# Patient Record
Sex: FEMALE | Race: WHITE | NOT HISPANIC OR LATINO | Employment: OTHER | URBAN - METROPOLITAN AREA
[De-identification: names, ages, dates, MRNs, and addresses within clinical notes are randomized per-mention and may not be internally consistent; named-entity substitution may affect disease eponyms.]

---

## 2017-05-11 ENCOUNTER — HOSPITAL ENCOUNTER (OUTPATIENT)
Facility: HOSPITAL | Age: 82
Discharge: HOME | End: 2017-05-12
Attending: EMERGENCY MEDICINE | Admitting: INTERNAL MEDICINE
Payer: MEDICARE

## 2017-05-11 DIAGNOSIS — I63.511 CEREBROVASCULAR ACCIDENT (CVA) DUE TO STENOSIS OF RIGHT MIDDLE CEREBRAL ARTERY: ICD-10-CM

## 2017-05-11 DIAGNOSIS — I63.519: ICD-10-CM

## 2017-05-11 DIAGNOSIS — I63.9 CEREBROVASCULAR ACCIDENT (CVA), UNSPECIFIED MECHANISM: Primary | ICD-10-CM

## 2017-05-11 DIAGNOSIS — R53.83 FATIGUE: ICD-10-CM

## 2017-05-11 DIAGNOSIS — I63.9 CVA (CEREBRAL VASCULAR ACCIDENT): ICD-10-CM

## 2017-05-11 LAB
ALBUMIN SERPL BCP-MCNC: 4.3 G/DL
ALP SERPL-CCNC: 79 U/L
ALT SERPL W/O P-5'-P-CCNC: 24 U/L
ANION GAP SERPL CALC-SCNC: 14 MMOL/L
AST SERPL-CCNC: 27 U/L
BASOPHILS # BLD AUTO: 0.03 K/UL
BASOPHILS NFR BLD: 0.4 %
BILIRUB SERPL-MCNC: 0.4 MG/DL
BILIRUB UR QL STRIP: NEGATIVE
BNP SERPL-MCNC: 114 PG/ML
BUN SERPL-MCNC: 23 MG/DL
CALCIUM SERPL-MCNC: 9.6 MG/DL
CHLORIDE SERPL-SCNC: 103 MMOL/L
CLARITY UR: CLEAR
CO2 SERPL-SCNC: 24 MMOL/L
COLOR UR: YELLOW
CREAT SERPL-MCNC: 0.9 MG/DL
DIFFERENTIAL METHOD: ABNORMAL
EOSINOPHIL # BLD AUTO: 0.7 K/UL
EOSINOPHIL NFR BLD: 8.5 %
ERYTHROCYTE [DISTWIDTH] IN BLOOD BY AUTOMATED COUNT: 13.9 %
EST. GFR  (AFRICAN AMERICAN): >60 ML/MIN/1.73 M^2
EST. GFR  (NON AFRICAN AMERICAN): 59 ML/MIN/1.73 M^2
GLUCOSE SERPL-MCNC: 87 MG/DL
GLUCOSE UR QL STRIP: NEGATIVE
HCT VFR BLD AUTO: 40.6 %
HGB BLD-MCNC: 13.7 G/DL
HGB UR QL STRIP: ABNORMAL
KETONES UR QL STRIP: NEGATIVE
LEUKOCYTE ESTERASE UR QL STRIP: ABNORMAL
LYMPHOCYTES # BLD AUTO: 2.5 K/UL
LYMPHOCYTES NFR BLD: 33.1 %
MCH RBC QN AUTO: 30.9 PG
MCHC RBC AUTO-ENTMCNC: 33.7 %
MCV RBC AUTO: 91 FL
MICROSCOPIC COMMENT: NORMAL
MONOCYTES # BLD AUTO: 0.7 K/UL
MONOCYTES NFR BLD: 8.7 %
NEUTROPHILS # BLD AUTO: 3.8 K/UL
NEUTROPHILS NFR BLD: 49.3 %
NITRITE UR QL STRIP: NEGATIVE
PH UR STRIP: 6 [PH] (ref 5–8)
PLATELET # BLD AUTO: 192 K/UL
PMV BLD AUTO: 11.1 FL
POTASSIUM SERPL-SCNC: 3.5 MMOL/L
PROT SERPL-MCNC: 8.3 G/DL
PROT UR QL STRIP: NEGATIVE
RBC # BLD AUTO: 4.44 M/UL
RBC #/AREA URNS HPF: 1 /HPF (ref 0–4)
SODIUM SERPL-SCNC: 141 MMOL/L
SP GR UR STRIP: 1.01 (ref 1–1.03)
TROPONIN I SERPL DL<=0.01 NG/ML-MCNC: 0.02 NG/ML
URN SPEC COLLECT METH UR: ABNORMAL
UROBILINOGEN UR STRIP-ACNC: NEGATIVE EU/DL
WBC # BLD AUTO: 7.68 K/UL
WBC #/AREA URNS HPF: 2 /HPF (ref 0–5)

## 2017-05-11 PROCEDURE — 93005 ELECTROCARDIOGRAM TRACING: CPT

## 2017-05-11 PROCEDURE — 80053 COMPREHEN METABOLIC PANEL: CPT

## 2017-05-11 PROCEDURE — 85025 COMPLETE CBC W/AUTO DIFF WBC: CPT

## 2017-05-11 PROCEDURE — 99285 EMERGENCY DEPT VISIT HI MDM: CPT | Mod: 25

## 2017-05-11 PROCEDURE — 83880 ASSAY OF NATRIURETIC PEPTIDE: CPT

## 2017-05-11 PROCEDURE — 84484 ASSAY OF TROPONIN QUANT: CPT

## 2017-05-11 PROCEDURE — 81000 URINALYSIS NONAUTO W/SCOPE: CPT

## 2017-05-11 PROCEDURE — 93010 ELECTROCARDIOGRAM REPORT: CPT | Mod: ,,, | Performed by: INTERNAL MEDICINE

## 2017-05-11 RX ORDER — ATORVASTATIN CALCIUM 40 MG/1
40 TABLET, FILM COATED ORAL DAILY
COMMUNITY

## 2017-05-11 NOTE — IP AVS SNAPSHOT
[address] [place] [address]           Patient Discharge Instructions   Our goal is to set you up for success. This packet includes information on your condition, medications, and your home care.  It will help you care for yourself to prevent having to return to the hospital.     Please ask your nurse if you have any questions.      There are many details to remember when preparing to leave the hospital. Here is what you will need to do:    1. Take your medicine. If you are prescribed medications, review your Medication List on the following pages. You may have new medications to  at the pharmacy and others that you'll need to stop taking. Review the instructions for how and when to take your medications. Talk with your doctor or nurses if you are unsure of what to do.     2. Go to your follow-up appointments. Specific follow-up information is listed in the following pages. Your may be contacted by a nurse or clinical provider about future appointments. Be sure we have all of the phone numbers to reach you. Please contact your provider's office if you are unable to make an appointment.     3. Watch for warning signs. Your doctor or nurse will give you detailed warning signs to watch for and when to call for assistance. These instructions may also include educational information about your condition. If you experience any of warning signs to your health, call your doctor.               ** Verify the list of medication(s) below is accurate and up to date. Carry this with you in case of emergency. If your medications have changed, please notify your healthcare provider.             Medication List      CONTINUE taking these medications        Additional Info                      atorvastatin 40 MG tablet   Commonly known as:  LIPITOR   Refills:  0   Dose:  40 mg    Last time this was given:  40 mg on 5/12/2017  9:15 AM   Instructions:  Take 40 mg by mouth once  daily.     Begin Date    AM    Noon    PM    Bedtime       ELIQUIS 2.5 mg Tab   Refills:  0   Generic drug:  apixaban    Last time this was given:  2.5 mg on 5/12/2017  9:22 AM   Instructions:  Take by mouth 2 (two) times daily.     Begin Date    AM    Noon    PM    Bedtime                  Please bring to all follow up appointments:    1. A copy of your discharge instructions.  2. All medicines you are currently taking in their original bottles.  3. Identification and insurance card.    Please arrive 15 minutes ahead of scheduled appointment time.    Please call 24 hours in advance if you must reschedule your appointment and/or time.        Follow-up Information     Schedule an appointment as soon as possible for a visit with Provider [facility].        Schedule an appointment as soon as possible for a visit in 1 week to follow up.    Why:  If symptoms worsen    Contact information:    upon returning to [place] make appointment with PCP.        Discharge Instructions     Future Orders    Activity as tolerated     Diet general     Questions:    Total calories:      Fat restriction, if any:      Protein restriction, if any:      Na restriction, if any:      Fluid restriction:      Additional restrictions:      Diet general     Questions:    Total calories:      Fat restriction, if any:      Protein restriction, if any:      Na restriction, if any:      Fluid restriction:      Additional restrictions:        Discharge References/Attachments     EATING HEART-HEALTHY FOODS (ENGLISH)    HEART-HEALTHY FOOD, EATING: USING THE DASH PLAN (ENGLISH)    MYPLATE WORKSHEET: 1,600 CALORIES (ENGLISH)        Primary Diagnosis     Your primary diagnosis was:  Not on File      Admission Information     Date & Time Provider Department CSN    5/11/2017 10:17 PM Leander Ortiz MD [facility] [id number]      Care Providers     Provider Role Specialty Primary office phone    Leander Ortiz MD Attending Provider Internal  "Medicine [phone]    Golden Jones MD Consulting Physician  Neurology [phone]    Homa Blackwell MD Team Attending  [facility] [phone]    Everardo Becerril MD Consulting Physician  Neurology [phone]      Your Vitals Were     BP Pulse Temp Resp Height Weight    146/66 (BP Location: Right arm, Patient Position: Lying, BP Method: Automatic) 61 98 °F (36.7 °C) (Oral) 18 4' 11" (1.499 m) 63.5 kg (140 lb)    SpO2 BMI             99% 28.28 kg/m2         Recent Lab Values     No lab values to display.      Pending Labs     Order Current Status    Hemoglobin A1c In process    Lipid panel In process    Troponin I #2 In process      Allergies as of 5/12/2017        Reactions    Pcn [Penicillins]     Shellfish Containing Products Hives      [facility]     [facility] Nurse Care Line - 24/7 Assistance  Unless otherwise directed by your provider, please contact [facility], our nurse care line that is available for 24/7 assistance.     Registered nurses in the [facility] provide clinical advisement, health education, appointment booking, and other advisory services.  Call for this free service at [phone].        Advance Directives     An advance directive is a document which, in the event you are no longer able to make decisions for yourself, tells your healthcare team what kind of treatment you do or do not want to receive, or who you would like to make those decisions for you.  If you do not currently have an advance directive, [facility] encourages you to create one.  For more information call:  [phone]), [phone] ([phone]),  or log on to [email or web address].        Language Assistance Services     ATTENTION: Language assistance services are available, free of charge. Please call [phone].      ATENCIÓN: Si habla español, tiene a benjamin disposición servicios gratuitos de asistencia lingüística. Llame al [phone].     CHÚ Ý: N?u " b?n nói Ti?ng Vi?t, có các d?ch v? h? tr? ngôn ng? mi?n phí dành cho b?n. G?i s? [phone].        Stroke Education              Eliquis Informaiton           [facility] Sign-Up     Activating your [facility] account is as easy as 1-2-3!     1) Visit [email or web address], select Sign Up Now, enter this activation code and your date of birth, then select Next.  [email or web address]  Expires: 6/26/2017  3:33 PM      2) Create a username and password to use when you visit [facility] in the future and select a security question in case you lose your password and select Next.    3) Enter your e-mail address and click Sign Up!    Additional Information  If you have questions, please e-mail [email or web address] or call [phone] to talk to our [facility] staff. Remember, [facility] is NOT to be used for urgent needs. For medical emergencies, dial 911.          [facility] -  complies with applicable Federal civil rights laws and does not discriminate on the basis of race, color, national origin, age, disability, or sex.

## 2017-05-12 VITALS
DIASTOLIC BLOOD PRESSURE: 66 MMHG | HEIGHT: 59 IN | SYSTOLIC BLOOD PRESSURE: 146 MMHG | RESPIRATION RATE: 18 BRPM | HEART RATE: 61 BPM | BODY MASS INDEX: 28.22 KG/M2 | OXYGEN SATURATION: 99 % | WEIGHT: 140 LBS | TEMPERATURE: 98 F

## 2017-05-12 PROBLEM — R53.83 FATIGUE: Status: ACTIVE | Noted: 2017-05-12

## 2017-05-12 PROBLEM — I63.9 CEREBROVASCULAR ACCIDENT (CVA): Status: ACTIVE | Noted: 2017-05-12

## 2017-05-12 PROBLEM — I10 ESSENTIAL HYPERTENSION: Status: ACTIVE | Noted: 2017-05-12

## 2017-05-12 LAB
ALBUMIN SERPL BCP-MCNC: 3.5 G/DL
ALP SERPL-CCNC: 64 U/L
ALT SERPL W/O P-5'-P-CCNC: 28 U/L
ANION GAP SERPL CALC-SCNC: 9 MMOL/L
AST SERPL-CCNC: 27 U/L
BASOPHILS # BLD AUTO: 0.03 K/UL
BASOPHILS NFR BLD: 0.4 %
BILIRUB SERPL-MCNC: 0.7 MG/DL
BUN SERPL-MCNC: 17 MG/DL
CALCIUM SERPL-MCNC: 9.1 MG/DL
CHLORIDE SERPL-SCNC: 108 MMOL/L
CHOLEST/HDLC SERPL: 2.2 {RATIO}
CO2 SERPL-SCNC: 25 MMOL/L
CREAT SERPL-MCNC: 0.8 MG/DL
DIFFERENTIAL METHOD: ABNORMAL
EOSINOPHIL # BLD AUTO: 0.7 K/UL
EOSINOPHIL NFR BLD: 9.7 %
ERYTHROCYTE [DISTWIDTH] IN BLOOD BY AUTOMATED COUNT: 13.7 %
EST. GFR  (AFRICAN AMERICAN): >60 ML/MIN/1.73 M^2
EST. GFR  (NON AFRICAN AMERICAN): >60 ML/MIN/1.73 M^2
ESTIMATED PA SYSTOLIC PRESSURE: 42.94
GLUCOSE SERPL-MCNC: 96 MG/DL
HCT VFR BLD AUTO: 36.4 %
HDL/CHOLESTEROL RATIO: 46.3 %
HDLC SERPL-MCNC: 164 MG/DL
HDLC SERPL-MCNC: 76 MG/DL
HGB BLD-MCNC: 12.2 G/DL
LDLC SERPL CALC-MCNC: 75 MG/DL
LYMPHOCYTES # BLD AUTO: 1.9 K/UL
LYMPHOCYTES NFR BLD: 28.8 %
MCH RBC QN AUTO: 30.4 PG
MCHC RBC AUTO-ENTMCNC: 33.5 %
MCV RBC AUTO: 91 FL
MONOCYTES # BLD AUTO: 0.6 K/UL
MONOCYTES NFR BLD: 8.8 %
NEUTROPHILS # BLD AUTO: 3.5 K/UL
NEUTROPHILS NFR BLD: 52.3 %
NONHDLC SERPL-MCNC: 88 MG/DL
PLATELET # BLD AUTO: 183 K/UL
PMV BLD AUTO: 10.7 FL
POTASSIUM SERPL-SCNC: 3.5 MMOL/L
PROT SERPL-MCNC: 6.5 G/DL
RBC # BLD AUTO: 4.01 M/UL
RETIRED EF AND QEF - SEE NOTES: 60 (ref 55–65)
SODIUM SERPL-SCNC: 142 MMOL/L
TRICUSPID VALVE REGURGITATION: ABNORMAL
TRIGL SERPL-MCNC: 65 MG/DL
WBC # BLD AUTO: 6.73 K/UL

## 2017-05-12 PROCEDURE — 97535 SELF CARE MNGMENT TRAINING: CPT

## 2017-05-12 PROCEDURE — 93306 TTE W/DOPPLER COMPLETE: CPT | Mod: 26,,, | Performed by: INTERNAL MEDICINE

## 2017-05-12 PROCEDURE — 97161 PT EVAL LOW COMPLEX 20 MIN: CPT

## 2017-05-12 PROCEDURE — G8988 SELF CARE GOAL STATUS: HCPCS | Mod: CH

## 2017-05-12 PROCEDURE — 25000003 PHARM REV CODE 250: Performed by: EMERGENCY MEDICINE

## 2017-05-12 PROCEDURE — 97116 GAIT TRAINING THERAPY: CPT

## 2017-05-12 PROCEDURE — G8987 SELF CARE CURRENT STATUS: HCPCS | Mod: CH

## 2017-05-12 PROCEDURE — 96374 THER/PROPH/DIAG INJ IV PUSH: CPT

## 2017-05-12 PROCEDURE — 36415 COLL VENOUS BLD VENIPUNCTURE: CPT

## 2017-05-12 PROCEDURE — 99203 OFFICE O/P NEW LOW 30 MIN: CPT | Mod: ,,, | Performed by: PSYCHIATRY & NEUROLOGY

## 2017-05-12 PROCEDURE — 97165 OT EVAL LOW COMPLEX 30 MIN: CPT

## 2017-05-12 PROCEDURE — 96375 TX/PRO/DX INJ NEW DRUG ADDON: CPT

## 2017-05-12 PROCEDURE — G8978 MOBILITY CURRENT STATUS: HCPCS | Mod: CH

## 2017-05-12 PROCEDURE — 80053 COMPREHEN METABOLIC PANEL: CPT

## 2017-05-12 PROCEDURE — 63600175 PHARM REV CODE 636 W HCPCS: Performed by: INTERNAL MEDICINE

## 2017-05-12 PROCEDURE — G8989 SELF CARE D/C STATUS: HCPCS | Mod: CH

## 2017-05-12 PROCEDURE — 25500020 PHARM REV CODE 255: Performed by: INTERNAL MEDICINE

## 2017-05-12 PROCEDURE — G8980 MOBILITY D/C STATUS: HCPCS | Mod: CH

## 2017-05-12 PROCEDURE — 80061 LIPID PANEL: CPT

## 2017-05-12 PROCEDURE — G0425 INPT/ED TELECONSULT30: HCPCS | Mod: GT,,, | Performed by: PSYCHIATRY & NEUROLOGY

## 2017-05-12 PROCEDURE — 83036 HEMOGLOBIN GLYCOSYLATED A1C: CPT

## 2017-05-12 PROCEDURE — 85025 COMPLETE CBC W/AUTO DIFF WBC: CPT

## 2017-05-12 PROCEDURE — 25000003 PHARM REV CODE 250: Performed by: INTERNAL MEDICINE

## 2017-05-12 PROCEDURE — G0378 HOSPITAL OBSERVATION PER HR: HCPCS

## 2017-05-12 PROCEDURE — 92523 SPEECH SOUND LANG COMPREHEN: CPT

## 2017-05-12 PROCEDURE — G8979 MOBILITY GOAL STATUS: HCPCS | Mod: CH

## 2017-05-12 RX ORDER — ONDANSETRON 2 MG/ML
4 INJECTION INTRAMUSCULAR; INTRAVENOUS EVERY 8 HOURS PRN
Status: DISCONTINUED | OUTPATIENT
Start: 2017-05-12 | End: 2017-05-12 | Stop reason: HOSPADM

## 2017-05-12 RX ORDER — SODIUM CHLORIDE 9 MG/ML
3 INJECTION, SOLUTION INTRAMUSCULAR; INTRAVENOUS; SUBCUTANEOUS EVERY 8 HOURS
Status: DISCONTINUED | OUTPATIENT
Start: 2017-05-12 | End: 2017-05-12 | Stop reason: HOSPADM

## 2017-05-12 RX ORDER — HYDRALAZINE HYDROCHLORIDE 20 MG/ML
10 INJECTION INTRAMUSCULAR; INTRAVENOUS EVERY 8 HOURS PRN
Status: DISCONTINUED | OUTPATIENT
Start: 2017-05-12 | End: 2017-05-12 | Stop reason: HOSPADM

## 2017-05-12 RX ORDER — GUAIFENESIN 100 MG/5ML
200 SOLUTION ORAL EVERY 4 HOURS PRN
Status: DISCONTINUED | OUTPATIENT
Start: 2017-05-12 | End: 2017-05-12 | Stop reason: HOSPADM

## 2017-05-12 RX ORDER — NAPROXEN SODIUM 220 MG/1
81 TABLET, FILM COATED ORAL DAILY
Status: DISCONTINUED | OUTPATIENT
Start: 2017-05-12 | End: 2017-05-12 | Stop reason: HOSPADM

## 2017-05-12 RX ORDER — ACETAMINOPHEN 325 MG/1
650 TABLET ORAL EVERY 6 HOURS PRN
Status: DISCONTINUED | OUTPATIENT
Start: 2017-05-12 | End: 2017-05-12 | Stop reason: HOSPADM

## 2017-05-12 RX ORDER — DIPHENHYDRAMINE HCL 25 MG
25 CAPSULE ORAL EVERY 6 HOURS PRN
Status: DISCONTINUED | OUTPATIENT
Start: 2017-05-12 | End: 2017-05-12 | Stop reason: HOSPADM

## 2017-05-12 RX ORDER — ATORVASTATIN CALCIUM 40 MG/1
40 TABLET, FILM COATED ORAL DAILY
Status: DISCONTINUED | OUTPATIENT
Start: 2017-05-12 | End: 2017-05-12 | Stop reason: HOSPADM

## 2017-05-12 RX ORDER — IPRATROPIUM BROMIDE AND ALBUTEROL SULFATE 2.5; .5 MG/3ML; MG/3ML
3 SOLUTION RESPIRATORY (INHALATION) EVERY 4 HOURS PRN
Status: DISCONTINUED | OUTPATIENT
Start: 2017-05-12 | End: 2017-05-12 | Stop reason: HOSPADM

## 2017-05-12 RX ADMIN — IOHEXOL 100 ML: 350 INJECTION, SOLUTION INTRAVENOUS at 12:05

## 2017-05-12 RX ADMIN — ACETAMINOPHEN 650 MG: 325 TABLET ORAL at 09:05

## 2017-05-12 RX ADMIN — APIXABAN 2.5 MG: 2.5 TABLET, FILM COATED ORAL at 09:05

## 2017-05-12 RX ADMIN — HYDRALAZINE HYDROCHLORIDE 10 MG: 20 INJECTION INTRAMUSCULAR; INTRAVENOUS at 02:05

## 2017-05-12 RX ADMIN — Medication 3 ML: at 06:05

## 2017-05-12 RX ADMIN — ATORVASTATIN CALCIUM 40 MG: 40 TABLET, FILM COATED ORAL at 09:05

## 2017-05-12 RX ADMIN — ASPIRIN 81 MG CHEWABLE TABLET 81 MG: 81 TABLET CHEWABLE at 02:05

## 2017-05-12 NOTE — CONSULTS
[facility]  [facility]-Consult    Consultation started: 5/12/2017 at 12:14 AM   The chief complaint leading to stroke consultation is:  stroke  The referring MD that requested this consult is: adams  The [facility] location is [facility]  [facility] [place] at bedside with patient assisting consultant.     Subjective:     History of Present Illness:  Alyssa De Luna is a 84 y.o. female who presents with Left sided weakness,  Known AFIB, Stroke, PPM. Non-complaint on eliquis having stopped for 2 days.  Noted left sided weakness, CT head shows Right frontal stroke without hemorrhagic elements    Last known normal:  this morning before 0900    Patient information was obtained from patient.    Past Medical History: hypertension and atrial fibrillation    Past Surgical History: no surgeries within the last 3 months    Family History: cancers    Social History: drinking    Medications: No current facility-administered medications for this encounter.     Current Outpatient Prescriptions:     apixaban (ELIQUIS) 2.5 mg Tab, Take by mouth 2 (two) times daily., Disp: , Rfl:     atorvastatin (LIPITOR) 40 MG tablet, Take 40 mg by mouth once daily., Disp: , Rfl:     Allergies:   Review of patient's allergies indicates:   Allergen Reactions    Pcn [penicillins]     Shellfish containing products Hives       Review Of Systems:     Consitutional: no fever or chills  Eyes:no visual changes  ENT:no nasal congestion or sore throat  Respiratory:no cough or shortness of breath  Cardiovascular:no chest pain or palpitations  Gastrointestinal:no nausea or vomiting, no abdominal pain or change in bowel habits  Genitourinary:no hematuria or dysuria  Integument/Breast: no rash or pruritis  Hematologic/Lymphatic:no easy bruising or lymphadenopathy  Musculoskeletal:no arthralgias or myalgias  Neurological:no seizures or tremors  Behavioral/Psych:no auditory or visual hallucinations  Endocrine:no heat or  cold intolerance      Objective:     Vitals:   Vitals:    05/11/17 2234   BP: (!) 202/80   Pulse: 61   Resp: 19   Temp:         Physical Exam:  General: well developed, well nourished   HENT: Head:normocephalic and atraumatic   HENT: Ears: right ear normal and left ear normal  Nose: normal nares and no discharge  Eyes:conjunctivae/corneas clear. PERRL.  Neck: normal, no obvious masses and trachea to midline  Lungs: normal respiratory effort  Cardiovascular: Heart: regular rate and rhythm   Cardiovascular: Extremities: no cyanosis, no edema and no clubbing  Abdomen: appears normal and not distended  Skin:  skin color and texture normal, no rash and no bruises  Musculoskeletal: normal range of motion in all extremities  Psych/Behavioral: appropriate affect      Stroke Scales   Current NIH Stroke Score Values         Most Recent Value    Interval  baseline (upon arrival/admit)    1a. Level Of Consciousness  0-->Alert: keenly responsive    1b. LOC Questions  0-->Answers both questions correctly    1c. LOC Commands  0-->Performs both tasks correctly    2. Best Gaze  0-->Normal    3. Visual  0-->No visual loss    4. Facial Palsy  0-->Normal symmetrical movements    5a. Motor Arm, Left  0-->No drift: limb holds 90 (or 45) degrees for full 10 secs    5b. Motor Arm, Right  0-->No drift: limb holds 90 (or 45) degrees for full 10 secs    6a. Motor Leg, Left  1-->Drift: leg falls by the end of the 5-sec period but does not hit bed    6b. Motor Leg, Right  0-->No drift: leg holds 30 degree position for full 5 secs    7. Limb Ataxia  0-->Absent    8. Sensory  0-->Normal: no sensory loss    9. Best Language  0-->No aphasia: normal    10. Dysarthria  0-->Normal    11. Extinction and Inattention (formerly Neglect)  0-->No abnormality    Total (NIH Stroke Scale)  1              Laboratory Data:   No results found for: EXTCAPBLOODG  No results found for: GLUCOSE   No results found for: EXTPT  No results found for: LABPROT  No results  found for: EXTPTT   No results found for: APTT  No results found for: EXTINR  No results found for: INR  No results found for: EXTCREATININ  Creatinine   Date Value Ref Range Status   05/11/2017 0.9 0.5 - 1.4 mg/dL Final     No results found for: EXTPLATELETS   Platelets   Date Value Ref Range Status   05/11/2017 192 150 - 350 K/uL Final        Assessment - Diagnosis - Goals:     Impression: Alyssa De Luna 84 y.o. female with Stroke  R frontal embolic,  Likely due to afib having accidentally halted eliquis.    Imaging Notes: Abnormal CT R frontal sub acute stroke. Impression performed at: 0024     Diagnosis: Other large vessel stenosis or occlusion with stroke (I63.09)  Other Diagnosis: I10 Hypertension  I48.91 Atrial Fibrillation  M62.89 Acute left side weakness    Altaplase Recommendation: Altaplase not recommended due to Outside of treament window and Unclear onset of symptoms    Possible Interventional Revascularization Candidate? No; No ischemic penumbra    Recommendation: NPO until after pass dysphagia screen Diagnostic studies: Carotid ultrasound to assess vasculature, CT scan of head without contrast to asses brain parenchyma, HgbA1C to assess blood glucose levels, Lipid Profile to assess cholesterol levels, Trans-thoracic cardiac echo to assess cardiac function/status    Disposition Recommendation:  admit to inpatient    Risk/Benefit Discussion: Risks and benefit of treatment (if indicated) were discussed with staff physician.     Additional Work up Recommended in the ED: None    Consulting clinician was informed of the encounter and consult note.    Consultation ended: 5/12/2017 at 0043.

## 2017-05-12 NOTE — PT/OT/SLP EVAL
Occupational Therapy  Evaluation    Alyssa De Luna   MRN: [id number]   Admitting Diagnosis: Cerebrovascular accident (CVA)    OT Date of Treatment: 17   OT Start Time: 0940  OT Stop Time: 1005  OT Total Time (min): 25 min    Billable Minutes:  Evaluation 15 minutes  Self Care/Home Management 10 minutes    Diagnosis: Cerebrovascular accident (CVA)       Past Medical History:   Diagnosis Date    Atrial fibrillation     High cholesterol     Stroke       Past Surgical History:   Procedure Laterality Date    CARDIAC PACEMAKER PLACEMENT      TOTAL KNEE ARTHROPLASTY Right        Referring physician: dr. moran    Date referred to OT: 17    General Precautions: Standard, fall  Orthopedic Precautions: N/A  Braces:            Patient History:  Living Environment  Lives With: alone  Living Arrangements: apartment  Home Layout: Able to live on 1st floor  Living Environment Comment: pt lives at home (i) and drives    Prior level of function:   Bed Mobility/Transfers: independent  Grooming: independent  Bathing: independent  Upper Body Dressing: independent  Lower Body Dressing: independent  Toileting: independent  Driving License: Yes  Mode of Transportation: Car  Occupation: Retired     Dominant hand: right    Subjective:  Communicated with nurse [facility] chart review prior to session.  Chief Complaint:   Patient/Family stated goals:    Pain Ratin/10                   Objective:  Patient found with: telemetry    Cognitive Exam:  Oriented to: Person, Place, Time and Situation  Follows Commands/attention: Follows one-step commands  Communication: clear/fluent  Memory:  No Deficits noted  Safety awareness/insight to disability: intact  Coping skills/emotional control: Appropriate to situation    Visual/perceptual:  Intact    Physical Exam:  Postural examination/scapula alignment: No postural abnormalities identified  Skin integrity: Visible skin intact and Thin  Edema: None noted     Sensation:   Intact    Upper  Extremity Range of Motion:  Right Upper Extremity: WFL  Left Upper Extremity: WFL    Upper Extremity Strength:  Right Upper Extremity: WFL  Left Upper Extremity: WFL   Strength: wfl    Fine motor coordination:   Intact    Gross motor coordination: WFL    Functional Mobility:  Bed Mobility:  Rolling/Turning to Left: Independent  Rolling/Turning Right: Independent  Scooting/Bridging: Independent  Supine to Sit: Independent  Sit to Supine: Independent    Transfers:  Sit <> Stand Assistance: Independent  Sit <> Stand Assistive Device: No Assistive Device  Bed <> Chair Technique: Stand Pivot  Bed <> Chair Transfer Assistance: Independent  Bed <> Chair Assistive Device: No Assistive Device  Toilet Transfer Technique: Stand Pivot  Toilet Transfer Assistance: Independent  Toilet Transfer Assistive Device: No Assistive Device    Functional Ambulation: pt ambulated  200 feet mod (i)    Activities of Daily Living:     Feeding adaptive equipment: na  UE Dressing Level of Assistance: Set-up Assistance  UE adaptive equipment: na  LE Dressing Level of Assistance: Set-up Assistance  LE adaptive equipment: na                    Bathing adaptive equipment: na    Balance:   Static Sit: GOOD+: Takes MAXIMAL challenges from all directions.    Dynamic Sit: GOOD+: Maintains balance through MAXIMAL excursions of active trunk motion  Static Stand: GOOD+: Takes MAXIMAL challenges from all directions  Dynamic stand: GOOD+: Independent gait (with or without assistive device)    Therapeutic Activities and Exercises:  Pt seen in room. Pt requested toileting. Pt req (i) with bed mobility and functional mobility x 8 feet. Pt mod (i) with toileting and (i) with functional mobility x 200 feet    AM-PAC 6 CLICK ADL  How much help from another person does this patient currently need?  1 = Unable, Total/Dependent Assistance  2 = A lot, Maximum/Moderate Assistance  3 = A little, Minimum/Contact Guard/Supervision  4 = None, Modified  "Rock/Independent         AM-PAC Raw Score CMS "G-Code Modifier Level of Impairment Assistance   6 % Total / Unable   7 - 9 CM 80 - 100% Maximal Assist   10 - 14 CL 60 - 80% Moderate Assist   15 - 19 CK 40 - 60% Moderate Assist   20 - 22 CJ 20 - 40% Minimal Assist   23 CI 1-20% SBA / CGA   24 CH 0% Independent/ Mod I       Patient left supine with all lines intact, call button in reach, bed alarm on and nurse oliva notified    Assessment:  Alyssa De Luna is a 84 y.o. female with a medical diagnosis of Cerebrovascular accident (CVA) and presents with pt discharged from skilled o.t. And placed on people movers.    Rehab identified problem list/impairments:      Rehab potential is good.    Activity tolerance: Good    Discharge recommendations: Discharge Facility/Level Of Care Needs: home     Barriers to discharge:      Equipment recommendations: none     GOALS:   Occupational Therapy Goals     Not on file          PLAN:  Patient to be seen   to address the above listed problems via    Plan of Care expires:    Plan of Care reviewed with: patient         Kassiamara Jacobzier, OT  05/12/2017  "

## 2017-05-12 NOTE — ASSESSMENT & PLAN NOTE
- CT head shows subacute right frontal infarct  - Patient symptomatically improving  - Aspirin  - Resume Eliquis  - Neurology consulted (per tele stroke not a TPA candidate)  - Stroke order set used  - Patient to fly back to [place] on May 20, 2017  - Unable to obtain MRI due to pacemake presence  - Check CTA Head/Neck  - Lipid profile, HbA1C  - PT/OT/ST evaluations

## 2017-05-12 NOTE — PLAN OF CARE
Problem: Patient Care Overview  Goal: Plan of Care Review  Outcome: Ongoing (interventions implemented as appropriate)  Pt. Currently resting in bed; NSR on monitor A-paced; on RA.  Bedside swallow study completed; pt. Tolerated well.  Neuro checks done; safety/fall precautions maintained.  Plan of care reviewed with pt. Will continue to monitor.

## 2017-05-12 NOTE — ED NOTES
Received report from LUZ oropeza Pt. In NAD, VSS, RR equal and unlabored. Pt's bed is low, locked, and call light in reach. SR up X2. Will continue to monitor pt.

## 2017-05-12 NOTE — ED PROVIDER NOTES
"SCRIBE #1 NOTE: I, Pedro Luis Powell, am scribing for, and in the presence of, Manfred Bacon Jr., MD. I have scribed the entire note.      History      Chief Complaint   Patient presents with    Fatigue     pt reports suddenly dropping cofffe cup this morning and feeling "foggy" all day; family reports that pt is moving slow and very fatigued       Review of patient's allergies indicates:   Allergen Reactions    Pcn [penicillins]     Shellfish containing products Hives        HPI   HPI    5/11/2017, 10:45 PM   History obtained from the nephew and patient      History of Present Illness: Alyssa De Luna is a 84 y.o. female patient, with Hx of a stroke, who presents to the Emergency Department for fatigue which onset suddenly 12 hours PTA. Symptoms are intermittent and moderate in severity. Pt reports she has felt "foggy" all day. Pt had had weakness in her left hand this morning. Pt reports dropping her coffee cup and a jar of mayonnaise this morning while holding it in her left hand. Pt's nephew reports the pt has been walking slower than normal today. Pt reports she missed taking her Eliquis for 2 days. No mitigating or exacerbating factors reported. No other associated sx reported. Patient denies any fever, chills, HA, lightheadedness, dizziness, speech difficulty, facial asymmetry, n/v/d, numbness, and all other sxs at this time. No further complaints or concerns at this time.         Arrival mode: Personal vehicle     PCP: [facility]       Past Medical History:  Past Medical History:   Diagnosis Date    Atrial fibrillation     High cholesterol     Stroke        Past Surgical History:  Past Surgical History:   Procedure Laterality Date    CARDIAC PACEMAKER PLACEMENT      TOTAL KNEE ARTHROPLASTY Right          Family History:  History reviewed. No pertinent family history.    Social History:  Social History     Social History Main Topics    Smoking status: Former Smoker    Smokeless tobacco: Unknown    " Alcohol use 1.2 oz/week     1 Glasses of wine, 1 Shots of liquor per week    Drug use: No    Sexual activity: Unknown       ROS   Review of Systems   Constitutional: Positive for fatigue. Negative for chills and fever.   HENT: Negative for sore throat.    Respiratory: Negative for shortness of breath.    Cardiovascular: Negative for chest pain.   Gastrointestinal: Negative for abdominal pain, diarrhea, nausea and vomiting.   Genitourinary: Negative for dysuria.   Musculoskeletal: Negative for back pain.   Skin: Negative for rash.   Neurological: Positive for weakness (left hand). Negative for dizziness, facial asymmetry, light-headedness, numbness and headaches.   Hematological: Does not bruise/bleed easily.       Physical Exam    Initial Vitals   BP Pulse Resp Temp SpO2   05/11/17 2014 05/11/17 2014 05/11/17 2014 05/11/17 2014 05/11/17 2014   165/60 68 20 98.3 °F (36.8 °C) 97 %      Physical Exam  Nursing Notes and Vital Signs Reviewed.  Constitutional: Patient is in no acute distress. Awake and alert. Well-developed and well-nourished.  Head: Atraumatic. Normocephalic.  Eyes: PERRL. EOM intact. Conjunctivae are not pale. No scleral icterus.  ENT: Mucous membranes are moist. Oropharynx is clear and symmetric.    Neck: Supple. Full ROM. No lymphadenopathy.  Cardiovascular: Regular rate. Regular rhythm. No murmurs, rubs, or gallops. Distal pulses are 2+ and symmetric.  Pulmonary/Chest: No respiratory distress. Clear to auscultation bilaterally. No wheezing, rales, or rhonchi.  Abdominal: Soft and non-distended.  There is no tenderness.  No rebound, guarding, or rigidity.   Musculoskeletal: Moves all extremities. No obvious deformities. No edema.   Skin: Warm and dry.  Neurological: Patient is alert and oriented to person, place and time. Pupils ERRL and EOM normal. Cranial nerves II-XII are intact. Strength is full bilaterally; it is equal and 5/5 in bilateral upper and lower extremities. There is no pronator drift  "of outstretched arms. Light touch sense is intact. Speech is clear and normal. No acute focal neurological deficits noted.  Psychiatric: Normal affect. Good eye contact. Appropriate in content.    ED Course    Procedures  ED Vital Signs:  Vitals:    05/11/17 2014 05/11/17 2225 05/11/17 2231 05/11/17 2234   BP: (!) 165/60   (!) 202/80   Pulse: 68 60  61   Resp: 20   19   Temp: 98.3 °F (36.8 °C)  98 °F (36.7 °C)    TempSrc: Oral  Oral    SpO2: 97%   100%   Weight: 59 kg (130 lb)      Height: 4' 11" (1.499 m)       05/12/17 0037 05/12/17 0043   BP:  (!) 191/53   Pulse:  60   Resp:  18   Temp: 97.9 °F (36.6 °C)    TempSrc: Oral    SpO2:  98%   Weight:     Height:         Abnormal Lab Results:  Labs Reviewed   CBC W/ AUTO DIFFERENTIAL - Abnormal; Notable for the following:        Result Value    Eos # 0.7 (*)     Eosinophil% 8.5 (*)     All other components within normal limits   COMPREHENSIVE METABOLIC PANEL - Abnormal; Notable for the following:     eGFR if non  59 (*)     All other components within normal limits   B-TYPE NATRIURETIC PEPTIDE - Abnormal; Notable for the following:      (*)     All other components within normal limits   URINALYSIS - Abnormal; Notable for the following:     Occult Blood UA 1+ (*)     Leukocytes, UA 1+ (*)     All other components within normal limits   TROPONIN I   URINALYSIS MICROSCOPIC   TROPONIN I        All Lab Results:  Results for orders placed or performed during the hospital encounter of 05/11/17   CBC auto differential   Result Value Ref Range    WBC 7.68 3.90 - 12.70 K/uL    RBC 4.44 4.00 - 5.40 M/uL    Hemoglobin 13.7 12.0 - 16.0 g/dL    Hematocrit 40.6 37.0 - 48.5 %    MCV 91 82 - 98 fL    MCH 30.9 27.0 - 31.0 pg    MCHC 33.7 32.0 - 36.0 %    RDW 13.9 11.5 - 14.5 %    Platelets 192 150 - 350 K/uL    MPV 11.1 9.2 - 12.9 fL    Gran # 3.8 1.8 - 7.7 K/uL    Lymph # 2.5 1.0 - 4.8 K/uL    Mono # 0.7 0.3 - 1.0 K/uL    Eos # 0.7 (H) 0.0 - 0.5 K/uL    Baso # " 0.03 0.00 - 0.20 K/uL    Gran% 49.3 38.0 - 73.0 %    Lymph% 33.1 18.0 - 48.0 %    Mono% 8.7 4.0 - 15.0 %    Eosinophil% 8.5 (H) 0.0 - 8.0 %    Basophil% 0.4 0.0 - 1.9 %    Differential Method Automated    Comprehensive metabolic panel   Result Value Ref Range    Sodium 141 136 - 145 mmol/L    Potassium 3.5 3.5 - 5.1 mmol/L    Chloride 103 95 - 110 mmol/L    CO2 24 23 - 29 mmol/L    Glucose 87 70 - 110 mg/dL    BUN, Bld 23 8 - 23 mg/dL    Creatinine 0.9 0.5 - 1.4 mg/dL    Calcium 9.6 8.7 - 10.5 mg/dL    Total Protein 8.3 6.0 - 8.4 g/dL    Albumin 4.3 3.5 - 5.2 g/dL    Total Bilirubin 0.4 0.1 - 1.0 mg/dL    Alkaline Phosphatase 79 55 - 135 U/L    AST 27 10 - 40 U/L    ALT 24 10 - 44 U/L    Anion Gap 14 8 - 16 mmol/L    eGFR if African American >60 >60 mL/min/1.73 m^2    eGFR if non African American 59 (A) >60 mL/min/1.73 m^2   Troponin I #1   Result Value Ref Range    Troponin I 0.020 0.000 - 0.026 ng/mL   B-Type natriuretic peptide (BNP)   Result Value Ref Range     (H) 0 - 99 pg/mL   Urinalysis   Result Value Ref Range    Specimen UA Urine, Clean Catch     Color, UA Yellow Yellow, Straw, Lila    Appearance, UA Clear Clear    pH, UA 6.0 5.0 - 8.0    Specific Gravity, UA 1.015 1.005 - 1.030    Protein, UA Negative Negative    Glucose, UA Negative Negative    Ketones, UA Negative Negative    Bilirubin (UA) Negative Negative    Occult Blood UA 1+ (A) Negative    Nitrite, UA Negative Negative    Urobilinogen, UA Negative <2.0 EU/dL    Leukocytes, UA 1+ (A) Negative   Urinalysis Microscopic   Result Value Ref Range    RBC, UA 1 0 - 4 /hpf    WBC, UA 2 0 - 5 /hpf    Microscopic Comment SEE COMMENT          Imaging Results:  Imaging Results         CT Head Without Contrast (Final result) Result time:  05/11/17 23:54:07    Final result by Pedro Luis Allen MD (05/11/17 23:54:07)    Impression:         Subacute right frontal territory infarct.  Old right parietal territory infarct.  Extensive small vessel disease.   Moderate atrophy.  No evidence of a mass or bleed.    All CT scans at this facility use dose modulation, iterative reconstruction, and/or weight based dosing when appropriate to reduce radiation dose to as low as reasonably achievable.       Electronically signed by: PEDRO LUIS MARSH MD  Date:     05/11/17  Time:    23:54     Narrative:    Exam: CT scan of the head without contrast    Clinical History:  R53.83 Other fatigue.      Findings:    Mild to moderate diffuse cerebral atrophy. Limits and changes in deep white matter posterior hemispheres consistent with extensive small vessel disease. Old right parietal territory infarct. Intracranial atherosclerotic calcifications. No evidence for mass or bleed.  No definite acute infarct identified.  Subacute right frontal territory infarct.            X-Ray Chest AP Portable (Final result) Result time:  05/11/17 23:51:27    Final result by Pedro Luis Marsh MD (05/11/17 23:51:27)    Impression:     No acute cardiopulmonary disease.      Electronically signed by: PEDRO LUIS MARSH MD  Date:     05/11/17  Time:    23:51     Narrative:    Exam: Portable chest radiograph    Clinical History:   fatigue.    Findings:     Coarsening of bronchovascular markings/COPD. Heart size within normal limits.  Multilead pacemaker.             The EKG was ordered, reviewed, and independently interpreted by the ED provider.  Interpretation time: 23:23  Rate: 60 BPM  Rhythm: Atrial-paced rhythm wiht prolonged AV conduction  Interpretation: Abnormal ECG. No STEMI.           The Emergency Provider reviewed the vital signs and test results, which are outlined above.    ED Discussion     12:40 AM: Dr. Bacon discussed the pt's case with Dr. Burrell (Telestroke) who recommends to admit the pt with a stroke evaluation. Pt should be started back on her Eliquis.    12:50 AM: Discussed case with Dr. Ortiz (Hospital Medicine). Dr. Ortiz agrees with current care and management of pt and accepts admission.    Admitting Service: Hospital medicine   Admitting Physician: Dr. Ortiz  Admit to: Telemetry    12:54 AM: Re-evaluated pt. I have discussed test results, shared treatment plan, and the need for admission with patient and family at bedside. Pt and family express understanding at this time and agree with all information. All questions answered. Pt and family have no further questions or concerns at this time. Pt is ready for admit.          ED Medication(s):  Medications   apixaban tablet 2.5 mg (not administered)   sodium chloride 0.9% injection 3 mL (not administered)   sodium chloride 0.9% bolus 500 mL (not administered)   aspirin chewable tablet 81 mg (not administered)   atorvastatin tablet 40 mg (not administered)   acetaminophen tablet 650 mg (not administered)   hydrALAZINE injection 10 mg (not administered)   ondansetron injection 4 mg (not administered)   albuterol-ipratropium 2.5mg-0.5mg/3mL nebulizer solution 3 mL (not administered)   guaifenesin 100 mg/5 ml syrup 200 mg (not administered)   diphenhydrAMINE capsule 25 mg (not administered)       Current Discharge Medication List                Medical Decision Making    Medical Decision Making:   Clinical Tests:   Lab Tests: Reviewed and Ordered  Radiological Study: Reviewed and Ordered  Medical Tests: Reviewed and Ordered           Scribe Attestation:   Scribe #1: I performed the above scribed service and the documentation accurately describes the services I performed. I attest to the accuracy of the note.    Attending:   Physician Attestation Statement for Scribe #1: I, Manfred Bacon Jr., MD, personally performed the services described in this documentation, as scribed by Pedro Luis Powell, in my presence, and it is both accurate and complete.          Clinical Impression       ICD-10-CM ICD-9-CM   1. Cerebrovascular accident (CVA), unspecified mechanism I63.9 434.91   2. Fatigue R53.83 780.79   3. CVA (cerebral vascular accident) I63.9 434.91        Disposition:   Disposition: Placed in Observation  Condition: Yaya Bacon Jr., MD  05/12/17 0235

## 2017-05-12 NOTE — PLAN OF CARE
Met with pt to complete d/c planning assessment. Pt is here visiting from [place] to take care of her sister with Parkinson's. Pt was planning on leaving to go back to [place] yesterday, but states she has rescheduled her flight for the 20th to return home. Pt lives alone in [place], but her daughter, Belkis, lives nearby and provides support as needed. Pt planning to d/c with no needs and return to [place].     05/12/17 5077   Discharge Assessment   Assessment Type Discharge Planning Assessment   Confirmed/corrected address and phone number on facesheet? Yes   Assessment information obtained from? Patient   Prior to hospitilization cognitive status: Unable to Assess   Prior to hospitalization functional status: Independent   Current cognitive status: Alert/Oriented   Current Functional Status: Independent   Lives With alone   Able to Return to Prior Arrangements yes   How many people do you have in your home that can help with your care after discharge? 1   Who are your caregiver(s) and their phone number(s)? Daughter, Belkis [phone]   Readmission Within The Last 30 Days no previous admission in last 30 days   Patient currently being followed by outpatient case management? No   Patient currently receives home health services? No   Does the patient currently use HME? No   Patient currently receives private duty nursing? No   Patient currently receives any other outside agency services? No   Equipment Currently Used at Home none   Does the patient have transportation to healthcare appointments? Yes   Transportation Available car   Discharge Plan A Home   Patient/Family In Agreement With Plan yes

## 2017-05-12 NOTE — PT/OT/SLP EVAL
Speech Language Pathology Evaluation    Alyssa De Luna   MRN: [id number]   Admitting Diagnosis: Cerebrovascular accident (CVA)    Diet recommendations:      Swallow not assessed due to pt being NPO for procedure    SLP Treatment Date: 17  Speech Start Time: 1031     Speech Stop Time: 1053     Speech Total (min): 22 min       TREATMENT BILLABLE MINUTES:  Eval 22     Diagnosis: Cerebrovascular accident (CVA)      Past Medical History:   Diagnosis Date    Atrial fibrillation     High cholesterol     Stroke      Past Surgical History:   Procedure Laterality Date    CARDIAC PACEMAKER PLACEMENT      TOTAL KNEE ARTHROPLASTY Right        Has the patient been evaluated by SLP for swallowing? : No  Keep patient NPO?: No   General Precautions: Standard,            Social Hx: Patient lives alone    Occupational/hobbies/homemaking: unknown    Subjective:  Pt cooperative, initially responded to questions without opening her eyes.  Patient goals: to get well and go home.    Pain Ratin/10              Pain Rating Post-Intervention: 0/10    Objective:   Patient found with: telemetry  Communication and cognitive eval completed.   Oral Musculature Evaluation  Oral Musculature: WFL     Cognitive Status:  Behavioral Observations: alert and appropriate-  Memory and Orientation: min A  Attention: SPV  Problem Solving: MIN A  Pragmatics: decreased eye contact  Executive Function: min A    Language: WNL    Auditory Comprehension: WFL    Verbal Expression: WNL    Motor Speech: WNL    Voice: WNL    Augmentative Alternative Communication: NA    Reading: NT    Writing: NT    Visual-Spatial: NT    Additional Treatment:      FIM:                                  Assessment:  Alyssa De Luna is a 84 y.o. female with a SLP diagnosis of Cognitive-Linguistic Impairment. She present with decreased memory and problem solving.         Discharge recommendations:       Goals:   SLP Goals        Problem: SLP Goal    Goal Priority Disciplines Outcome    SLP Goal     SLP    Description:  1. Pt will improve memory skills to SPV.  2. Pt will improve problem solving to SPV.               Plan:   Patient to be seen Therapy Frequency: 3 x/week   Plan of Care expires: 05/19/17  Plan of Care reviewed with: patient  SLP Follow-up?: Yes              Sandra Worthington CCC-SLP  05/12/2017

## 2017-05-12 NOTE — DISCHARGE SUMMARY
[facility] Hospital Medicine  Discharge Summary      Patient Name: Alyssa De Luna  MRN: [id number]  Admission Date: 5/11/2017  Hospital Length of Stay: 0 days  Discharge Date and Time:  05/12/2017 3:16 PM  Attending Physician: Leander Ortiz MD   Discharging Provider: Homa Blackwell MD  Primary Care Provider: Provider Notinsystem      HPI:   Ms. De Luna is a 83 y/o  female with h/o old stroke, A.Fib on Eliquis, originally from [place], visiting family here, presents to the ED with c/o left sided weakness since around 9 AM earlier today. She has not take Eliquis for 2-3 days for unclear reasons. She is scheduled to fly back to [place] on May 20. Patient has pacemake. CT head shows subacute left frontal lobe ischemia. Tele stroke recommend against TPA due to outside window and patient's symptoms are improving.     * No surgery found *      Indwelling Lines/Drains at time of discharge:   Lines/Drains/Airways          No matching active lines, drains, or airways        Hospital Course:   Ms. DeL una was admitted to telemetry and resumed on her Eliquis and given aspirin. She did have PT/OT/ST evaluation and recommended for outpatient PT as her symptoms were improving. Neurology consulted and agreed with current medical management and felt her stroke was more acute than subacute. CT angiogram did confirm about 50-55% stenosis of her right proximal ICA. Patient already had history of previous stroke and had missed her medications for the past few days as she had been her from [place] caring for her sister. She did have an elevation in her blood pressure but we allowed it to be slightly elevated for permissive hypertension in light of acute stroke.     Consults:   Consults         Status Ordering Provider     Inpatient consult to Neurology  Once     Provider:  Everardo Becerril MD    Completed TESSIE BAKER JR     Inpatient consult to Stroke Telemedicine  Once     Provider:  Golden Jones MD    Completed  TESSIE BAKER JR     IP consult to case management/social work  Once     Provider:  (Not yet assigned)    Completed [facility]     IP consult to dietary  Once     Provider:  (Not yet assigned)    Acknowledged [facility]          Significant Diagnostic Studies: Labs:   BMP:     Recent Labs  Lab 05/11/17  2314 05/12/17  0515   GLU 87 96    142   K 3.5 3.5    108   CO2 24 25   BUN 23 17   CREATININE 0.9 0.8   CALCIUM 9.6 9.1   , CBC     Recent Labs  Lab 05/11/17  2314 05/12/17  0516   WBC 7.68 6.73   HGB 13.7 12.2   HCT 40.6 36.4*    183    and Lipid Panel No results found for: CHOL, HDL, LDLCALC, TRIG, CHOLHDL  Radiology:   Imaging Results         CTA Neck (In process)         CTA Head (Final result) Result time:  05/12/17 14:03:56    Final result by Volodymyr Hollingsworth MD (05/12/17 14:03:56)    Impression:         1.  Right middle cerebral artery M4 branch occlusion corresponding to the chronic right parietal cortical infarct.    2.  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  Note that the posterior cerebral arteries originate from the posterior indicating arteries bilaterally.    3.  Bilateral common carotid artery bifurcation plaque as described above with 50-55% right and 0-20% left internal carotid artery stenosis.    4.  Anatomic variant of direct origin of the left vertebral artery from the aortic arch.        All CT scans at this facility use dose modulation, iterative reconstruction and/or weight based dosing when appropriate to reduce radiation dose to as low as reasonably achievable.       Electronically signed by: VOLODYMYR HOLLINGSWORTH MD  Date:     05/12/17  Time:    14:03     Narrative:    CTA NECK AND HEAD    Date: 05/12/17 12:23:37    Clinical indication:  Vessel stenosis.  Abnormal carotid ultrasound. TIA    Standard contrast enhanced CTA of neck and brain with 100ml of Omnipaque 350 with 3D MIP reformations. No previous for comparison.    CTA  neck: The aortic arch reveals direct origin of the left vertebral artery between the left common carotid artery and subclavian artery.  Incidentally a left chest wall pacemaker is present.    Right brachiocephalic artery reveals mild calcification.  The right common carotid artery reveals a mild tortuosity.  Opacification reveals calcified plaque involving the bifurcation, internal carotid artery bulb and proximal internal carotid artery.  The residual internal carotid artery lumen is approximately 2.5 mm but the distal internal carotid artery lumen approximately 4.7 mm.  This is consistent with a 50-55% stenosis.  The remainder of the right cervical ICA is patent.    The left common carotid artery is mildly tortuous but with minimal calcified plaque.  The left common carotid artery bifurcation and internal carotid artery bulb reveal minimal predominantly noncalcified plaque.  The residual lumen is approximately 3.7 mm.  The distal lumen is approximately 4.3 mm.  This is consistent with a less than 20% stenosis.    The cervical vertebral arteries are patent.    CTA brain:    Unfortunately there is significant metallic artifact at the skull base which obscures the skull base vertebral arteries.  The right and left posterior inferior cerebellar arteries are patent.  There is variable high-grade stenosis or occlusion of the basilar artery.  However, the posterior cerebral arteries predominantly arise from posterior communicating arteries.    Left internal carotid artery reveals mild calcified plaque.  The left anterior and middle cerebral artery branches appear normal.    The right internal carotid artery reveals mild calcified plaque.  The right anterior cerebral artery is normal.  The right middle cerebral artery is patent proximally.  There is a single branch of the right middle cerebral artery which terminates in the vicinity of the chronic right cortical infarct.  Probable chronic right MCA branch arterial  occlusion.    Measurements based on NASCET criteria.            US Carotid Bilateral (Final result) Result time:  05/12/17 11:13:05    Final result by Yann Victor III, MD (05/12/17 11:13:05)    Impression:           50-59% stenosis of the proximal right ICA. No evidence of hemodynamically significant stenosis in the left ICA. Loss of diastolic flow in the left vertebral artery which can be seen with a low-grade more proximal vertebral artery or subclavian stenosis.       Electronically signed by: YANN VICTOR MD  Date:     05/12/17  Time:    11:13     Narrative:    Bilateral carotid and vertebral arterial ultrasound.    Clinical Indication: Stroke, carotid artery stenosis.       Findings:   There is heterogeneous calcified atherosclerotic plaque in the bilateral carotid bulbs and proximal internal carotid arteries, right greater than left.  Elevated peak systolic velocity of the proximal right internal carotid artery of 175 cm/s corresponds to a stenosis of 50-59% by Doppler criteria.  There is no evidence of left carotid artery hemodynamically significant stenosis.  Normal waveforms and antegrade flow seen in the right vertebral artery.  There is antegrade flow of the left vertebral artery with loss of the diastolic flow without flow reversal which can be seen with a low-grade more proximal vertebral artery or subclavian stenosis. Validated velocity measurements with angiographic measurements, velocity criteria are extrapolated from diameter data as defined by the Society of Radiologists in Ultrasound Consensus Conference, Radiology 2003; 229; 340-346.      The peak systolic velocities are as follows:  Right CCA 90 cm/sec  Right  cm/sec  Right ICA to CCA ratio 1.9  Left CCA 97 cm/sec  Left  cm/sec  Left ICA to CCA ratio 1.1            CT Head Without Contrast (Final result) Result time:  05/11/17 23:54:07    Final result by Pedro Luis Allen MD (05/11/17 23:54:07)    Impression:         Subacute  right frontal territory infarct.  Old right parietal territory infarct.  Extensive small vessel disease.  Moderate atrophy.  No evidence of a mass or bleed.    All CT scans at this facility use dose modulation, iterative reconstruction, and/or weight based dosing when appropriate to reduce radiation dose to as low as reasonably achievable.       Electronically signed by: PEDRO LUIS ALLEN MD  Date:     05/11/17  Time:    23:54     Narrative:    Exam: CT scan of the head without contrast    Clinical History:  R53.83 Other fatigue.      Findings:    Mild to moderate diffuse cerebral atrophy. Limits and changes in deep white matter posterior hemispheres consistent with extensive small vessel disease. Old right parietal territory infarct. Intracranial atherosclerotic calcifications. No evidence for mass or bleed.  No definite acute infarct identified.  Subacute right frontal territory infarct.            X-Ray Chest AP Portable (Final result) Result time:  05/11/17 23:51:27    Final result by Pedro Luis Allen MD (05/11/17 23:51:27)    Impression:     No acute cardiopulmonary disease.      Electronically signed by: PEDRO LUIS ALLEN MD  Date:     05/11/17  Time:    23:51     Narrative:    Exam: Portable chest radiograph    Clinical History:   fatigue.    Findings:     Coarsening of bronchovascular markings/COPD. Heart size within normal limits.  Multilead pacemaker.                Pending Diagnostic Studies:     Procedure Component Value Units Date/Time    CTA Neck [[id number]] Resulted:  05/12/17 1223    Order Status:  Sent Lab Status:  In process Updated:  05/12/17 1303    Hemoglobin A1c [[id number]] Collected:  05/12/17 0516    Order Status:  Sent Lab Status:  In process Updated:  05/12/17 1450    Specimen:  Blood from Blood     Narrative:       Fasting    Lipid panel [[id number]] Collected:  05/12/17 0516    Order Status:  Sent Lab Status:  In process Updated:  05/12/17 1450    Specimen:  Blood from Blood     Narrative:       Fasting     Troponin I #2 [[id number]] Collected:  05/11/17 2314    Order Status:  Sent Lab Status:  In process Updated:  05/11/17 2315    Specimen:  Blood from Blood         Final Active Diagnoses:    Diagnosis Date Noted POA    PRINCIPAL PROBLEM:  Cerebrovascular accident (CVA) [I63.9] 05/12/2017 Yes    Essential hypertension [I10] 05/12/2017 Yes      Problems Resolved During this Admission:    Diagnosis Date Noted Date Resolved POA      No new Assessment & Plan notes have been filed under this hospital service since the last note was generated.  Service: Hospital Medicine      Discharged Condition: stable    Disposition: Home or Self Care    Follow Up:  Follow-up Information     Schedule an appointment as soon as possible for a visit with Provider Notinsystem.        Schedule an appointment as soon as possible for a visit in 1 week to follow up.    Why:  If symptoms worsen    Contact information:    upon returning to [place] make appointment with PCP.        Patient Instructions:     Diet general     Diet general     Activity as tolerated       Medications:  Reconciled Home Medications:   Current Discharge Medication List      CONTINUE these medications which have NOT CHANGED    Details   apixaban (ELIQUIS) 2.5 mg Tab Take by mouth 2 (two) times daily.      atorvastatin (LIPITOR) 40 MG tablet Take 40 mg by mouth once daily.           Time spent on the discharge of patient: 30 minutes    Homa Blackwell MD  Department of Hospital Medicine  [facility]

## 2017-05-12 NOTE — H&P
"[facility] Hospital Medicine  History & Physical    Patient Name: Alyssa De Luna  MRN: [id number]  Admission Date: 5/11/2017  Attending Physician: Leander Ortiz MD  Primary Care Provider: Provider Notinsystem         Patient information was obtained from patient and ER records.     Subjective:     Principal Problem:Cerebrovascular accident (CVA)    Chief Complaint:   Chief Complaint   Patient presents with    Fatigue     pt reports suddenly dropping cofffe cup this morning and feeling "foggy" all day; family reports that pt is moving slow and very fatigued        HPI: Ms. De Luna is a 85 y/o  female with h/o old stroke, A.Fib on Eliquis, originally from [place], visiting family here, presents to the ED with c/o left sided weakness since around 9 AM earlier today. She has not take Eliquis for 2-3 days for unclear reasons. She is scheduled to fly back to [place] on May 20. Patient has pacemake. CT head shows subacute left frontal lobe ischemia. Tele stroke recommend against TPA due to outside window and patient's symptoms are improving.     Past Medical History:   Diagnosis Date    Atrial fibrillation     High cholesterol     Stroke        Past Surgical History:   Procedure Laterality Date    CARDIAC PACEMAKER PLACEMENT      TOTAL KNEE ARTHROPLASTY Right        Review of patient's allergies indicates:   Allergen Reactions    Pcn [penicillins]     Shellfish containing products Hives       No current facility-administered medications on file prior to encounter.      No current outpatient prescriptions on file prior to encounter.     Family History     Reviewed and Not Pertinent        Social History Main Topics    Smoking status: Former Smoker    Smokeless tobacco: Not on file    Alcohol use 1.2 oz/week     1 Glasses of wine, 1 Shots of liquor per week    Drug use: No    Sexual activity: Not on file     Review of Systems   Constitutional: Positive for fatigue. Negative for chills, diaphoresis and " fever.   HENT: Negative.  Negative for congestion, nosebleeds and sinus pressure.    Eyes: Negative.  Negative for photophobia, redness and visual disturbance.   Respiratory: Negative.  Negative for cough, chest tightness, shortness of breath and wheezing.    Cardiovascular: Negative.  Negative for chest pain, palpitations and leg swelling.   Gastrointestinal: Negative.  Negative for abdominal pain, diarrhea, nausea and vomiting.   Endocrine: Negative.  Negative for polydipsia, polyphagia and polyuria.   Genitourinary: Negative.  Negative for dysuria, flank pain, frequency and urgency.   Musculoskeletal: Negative.  Negative for back pain, joint swelling and neck stiffness.   Skin: Negative.  Negative for color change, pallor and rash.   Allergic/Immunologic: Negative.  Negative for environmental allergies, food allergies and immunocompromised state.   Neurological: Positive for weakness (left arm, improving). Negative for dizziness, syncope, speech difficulty, numbness and headaches.   Hematological: Negative.  Negative for adenopathy. Does not bruise/bleed easily.   Psychiatric/Behavioral: Negative.  Negative for confusion, decreased concentration and hallucinations. The patient is not nervous/anxious.    All other systems reviewed and are negative.    Objective:     Vital Signs (Most Recent):  Temp: 97.9 °F (36.6 °C) (05/12/17 0037)  Pulse: 60 (05/12/17 0043)  Resp: 18 (05/12/17 0043)  BP: (!) 191/53 (05/12/17 0043)  SpO2: 98 % (05/12/17 0043) Vital Signs (24h Range):  Temp:  [97.9 °F (36.6 °C)-98.3 °F (36.8 °C)] 97.9 °F (36.6 °C)  Pulse:  [60-68] 60  Resp:  [18-20] 18  SpO2:  [97 %-100 %] 98 %  BP: (165-202)/(53-80) 191/53     Weight: 59 kg (130 lb)  Body mass index is 26.26 kg/(m^2).    Physical Exam   Constitutional: She is oriented to person, place, and time. She appears well-developed and well-nourished. No distress.   HENT:   Head: Normocephalic and atraumatic.   Eyes: Conjunctivae and EOM are normal. No  scleral icterus.   Neck: Normal range of motion. Neck supple. No JVD present. No tracheal deviation present. No thyromegaly present.   Cardiovascular: Normal rate, regular rhythm, normal heart sounds and intact distal pulses.    No murmur heard.  Pulmonary/Chest: Effort normal and breath sounds normal. No respiratory distress. She has no wheezes. She has no rales. She exhibits no tenderness.   Abdominal: Soft. Bowel sounds are normal. She exhibits no distension. There is no tenderness.   Musculoskeletal: Normal range of motion. She exhibits no edema or tenderness.   Lymphadenopathy:     She has no cervical adenopathy.   Neurological: She is alert and oriented to person, place, and time. No cranial nerve deficit. She exhibits normal muscle tone. Coordination normal.   Mild weakness in LUE   Skin: Skin is warm and dry. She is not diaphoretic. No erythema.   Psychiatric: She has a normal mood and affect. Her behavior is normal. Judgment and thought content normal.   Nursing note and vitals reviewed.       Significant Labs:   ABGs: No results for input(s): PH, PCO2, HCO3, POCSATURATED, BE in the last 48 hours.  Blood Culture: No results for input(s): LABBLOO in the last 48 hours.  BMP:   Recent Labs  Lab 05/11/17  2314   GLU 87      K 3.5      CO2 24   BUN 23   CREATININE 0.9   CALCIUM 9.6     CBC:   Recent Labs  Lab 05/11/17  2314   WBC 7.68   HGB 13.7   HCT 40.6        CMP:   Recent Labs  Lab 05/11/17  2314      K 3.5      CO2 24   GLU 87   BUN 23   CREATININE 0.9   CALCIUM 9.6   PROT 8.3   ALBUMIN 4.3   BILITOT 0.4   ALKPHOS 79   AST 27   ALT 24   ANIONGAP 14   EGFRNONAA 59*     Cardiac Markers:   Recent Labs  Lab 05/11/17  2314   *     Lactic Acid: No results for input(s): LACTATE in the last 48 hours.  Lipid Panel: No results for input(s): CHOL, HDL, LDLCALC, TRIG, CHOLHDL in the last 48 hours.  Prealbumin: No results for input(s): PREALBUMIN in the last 48 hours.  Troponin:    Recent Labs  Lab 05/11/17  2314   TROPONINI 0.020     TSH: No results for input(s): TSH in the last 4320 hours.  Urine Studies:   Recent Labs  Lab 05/11/17  2315   COLORU Yellow   APPEARANCEUA Clear   PHUR 6.0   SPECGRAV 1.015   PROTEINUA Negative   GLUCUA Negative   KETONESU Negative   BILIRUBINUA Negative   OCCULTUA 1+*   NITRITE Negative   UROBILINOGEN Negative   LEUKOCYTESUR 1+*   RBCUA 1   WBCUA 2     All pertinent labs within the past 24 hours have been reviewed.    Significant Imaging:   Imaging Results         CT Head Without Contrast (Final result) Result time:  05/11/17 23:54:07    Final result by Pedro Luis Allen MD (05/11/17 23:54:07)    Impression:         Subacute right frontal territory infarct.  Old right parietal territory infarct.  Extensive small vessel disease.  Moderate atrophy.  No evidence of a mass or bleed.    All CT scans at this facility use dose modulation, iterative reconstruction, and/or weight based dosing when appropriate to reduce radiation dose to as low as reasonably achievable.       Electronically signed by: PEDRO LUIS ALLEN MD  Date:     05/11/17  Time:    23:54     Narrative:    Exam: CT scan of the head without contrast    Clinical History:  R53.83 Other fatigue.      Findings:    Mild to moderate diffuse cerebral atrophy. Limits and changes in deep white matter posterior hemispheres consistent with extensive small vessel disease. Old right parietal territory infarct. Intracranial atherosclerotic calcifications. No evidence for mass or bleed.  No definite acute infarct identified.  Subacute right frontal territory infarct.            X-Ray Chest AP Portable (Final result) Result time:  05/11/17 23:51:27    Final result by Pedro Luis Allen MD (05/11/17 23:51:27)    Impression:     No acute cardiopulmonary disease.      Electronically signed by: PEDRO LUIS ALLEN MD  Date:     05/11/17  Time:    23:51     Narrative:    Exam: Portable chest radiograph    Clinical History:    fatigue.    Findings:     Coarsening of bronchovascular markings/COPD. Heart size within normal limits.  Multilead pacemaker.                Assessment/Plan:     * Cerebrovascular accident (CVA)  - CT head shows subacute right frontal infarct  - Patient symptomatically improving  - Aspirin  - Resume Eliquis  - Neurology consulted (per tele stroke not a TPA candidate)  - Stroke order set used  - Patient to fly back to [place] on May 20, 2017  - Unable to obtain MRI due to pacemake presence  - Check CTA Head/Neck  - Lipid profile, HbA1C  - PT/OT/ST evaluations      Essential hypertension  - Allow permissive HTN for first 24 hours      VTE Risk Mitigation         Ordered     apixaban tablet 2.5 mg  2 times daily     Route:  Oral        05/12/17 0058        Leander Ortiz MD  Department of Hospital Medicine   [facility]
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
## 2017-05-12 NOTE — SUBJECTIVE & OBJECTIVE
Past Medical History:   Diagnosis Date    Atrial fibrillation     High cholesterol     Stroke        Past Surgical History:   Procedure Laterality Date    CARDIAC PACEMAKER PLACEMENT      TOTAL KNEE ARTHROPLASTY Right        Review of patient's allergies indicates:   Allergen Reactions    Pcn [penicillins]     Shellfish containing products Hives       No current facility-administered medications on file prior to encounter.      No current outpatient prescriptions on file prior to encounter.     Family History     None        Social History Main Topics    Smoking status: Former Smoker    Smokeless tobacco: Not on file    Alcohol use 1.2 oz/week     1 Glasses of wine, 1 Shots of liquor per week    Drug use: No    Sexual activity: Not on file     Review of Systems   Constitutional: Positive for fatigue. Negative for chills, diaphoresis and fever.   HENT: Negative.  Negative for congestion, nosebleeds and sinus pressure.    Eyes: Negative.  Negative for photophobia, redness and visual disturbance.   Respiratory: Negative.  Negative for cough, chest tightness, shortness of breath and wheezing.    Cardiovascular: Negative.  Negative for chest pain, palpitations and leg swelling.   Gastrointestinal: Negative.  Negative for abdominal pain, diarrhea, nausea and vomiting.   Endocrine: Negative.  Negative for polydipsia, polyphagia and polyuria.   Genitourinary: Negative.  Negative for dysuria, flank pain, frequency and urgency.   Musculoskeletal: Negative.  Negative for back pain, joint swelling and neck stiffness.   Skin: Negative.  Negative for color change, pallor and rash.   Allergic/Immunologic: Negative.  Negative for environmental allergies, food allergies and immunocompromised state.   Neurological: Positive for weakness (left arm, improving). Negative for dizziness, syncope, speech difficulty, numbness and headaches.   Hematological: Negative.  Negative for adenopathy. Does not bruise/bleed easily.    Psychiatric/Behavioral: Negative.  Negative for confusion, decreased concentration and hallucinations. The patient is not nervous/anxious.    All other systems reviewed and are negative.    Objective:     Vital Signs (Most Recent):  Temp: 97.9 °F (36.6 °C) (05/12/17 0037)  Pulse: 60 (05/12/17 0043)  Resp: 18 (05/12/17 0043)  BP: (!) 191/53 (05/12/17 0043)  SpO2: 98 % (05/12/17 0043) Vital Signs (24h Range):  Temp:  [97.9 °F (36.6 °C)-98.3 °F (36.8 °C)] 97.9 °F (36.6 °C)  Pulse:  [60-68] 60  Resp:  [18-20] 18  SpO2:  [97 %-100 %] 98 %  BP: (165-202)/(53-80) 191/53     Weight: 59 kg (130 lb)  Body mass index is 26.26 kg/(m^2).    Physical Exam   Constitutional: She is oriented to person, place, and time. She appears well-developed and well-nourished. No distress.   HENT:   Head: Normocephalic and atraumatic.   Eyes: Conjunctivae and EOM are normal. No scleral icterus.   Neck: Normal range of motion. Neck supple. No JVD present. No tracheal deviation present. No thyromegaly present.   Cardiovascular: Normal rate, regular rhythm, normal heart sounds and intact distal pulses.    No murmur heard.  Pulmonary/Chest: Effort normal and breath sounds normal. No respiratory distress. She has no wheezes. She has no rales. She exhibits no tenderness.   Abdominal: Soft. Bowel sounds are normal. She exhibits no distension. There is no tenderness.   Musculoskeletal: Normal range of motion. She exhibits no edema or tenderness.   Lymphadenopathy:     She has no cervical adenopathy.   Neurological: She is alert and oriented to person, place, and time. No cranial nerve deficit. She exhibits normal muscle tone. Coordination normal.   Mild weakness in LUE   Skin: Skin is warm and dry. She is not diaphoretic. No erythema.   Psychiatric: She has a normal mood and affect. Her behavior is normal. Judgment and thought content normal.   Nursing note and vitals reviewed.       Significant Labs:   ABGs: No results for input(s): PH, PCO2, HCO3,  POCSATURATED, BE in the last 48 hours.  Blood Culture: No results for input(s): LABBLOO in the last 48 hours.  BMP:   Recent Labs  Lab 05/11/17 2314   GLU 87      K 3.5      CO2 24   BUN 23   CREATININE 0.9   CALCIUM 9.6     CBC:   Recent Labs  Lab 05/11/17 2314   WBC 7.68   HGB 13.7   HCT 40.6        CMP:   Recent Labs  Lab 05/11/17 2314      K 3.5      CO2 24   GLU 87   BUN 23   CREATININE 0.9   CALCIUM 9.6   PROT 8.3   ALBUMIN 4.3   BILITOT 0.4   ALKPHOS 79   AST 27   ALT 24   ANIONGAP 14   EGFRNONAA 59*     Cardiac Markers:   Recent Labs  Lab 05/11/17 2314   *     Lactic Acid: No results for input(s): LACTATE in the last 48 hours.  Lipid Panel: No results for input(s): CHOL, HDL, LDLCALC, TRIG, CHOLHDL in the last 48 hours.  Prealbumin: No results for input(s): PREALBUMIN in the last 48 hours.  Troponin:   Recent Labs  Lab 05/11/17 2314   TROPONINI 0.020     TSH: No results for input(s): TSH in the last 4320 hours.  Urine Studies:   Recent Labs  Lab 05/11/17 2315   COLORU Yellow   APPEARANCEUA Clear   PHUR 6.0   SPECGRAV 1.015   PROTEINUA Negative   GLUCUA Negative   KETONESU Negative   BILIRUBINUA Negative   OCCULTUA 1+*   NITRITE Negative   UROBILINOGEN Negative   LEUKOCYTESUR 1+*   RBCUA 1   WBCUA 2     All pertinent labs within the past 24 hours have been reviewed.    Significant Imaging:   Imaging Results         CT Head Without Contrast (Final result) Result time:  05/11/17 23:54:07    Final result by Pedro Luis Allen MD (05/11/17 23:54:07)    Impression:         Subacute right frontal territory infarct.  Old right parietal territory infarct.  Extensive small vessel disease.  Moderate atrophy.  No evidence of a mass or bleed.    All CT scans at this facility use dose modulation, iterative reconstruction, and/or weight based dosing when appropriate to reduce radiation dose to as low as reasonably achievable.       Electronically signed by: PEDRO LUIS ALLEN MD  Date:      05/11/17  Time:    23:54     Narrative:    Exam: CT scan of the head without contrast    Clinical History:  R53.83 Other fatigue.      Findings:    Mild to moderate diffuse cerebral atrophy. Limits and changes in deep white matter posterior hemispheres consistent with extensive small vessel disease. Old right parietal territory infarct. Intracranial atherosclerotic calcifications. No evidence for mass or bleed.  No definite acute infarct identified.  Subacute right frontal territory infarct.            X-Ray Chest AP Portable (Final result) Result time:  05/11/17 23:51:27    Final result by Pedro Luis Allen MD (05/11/17 23:51:27)    Impression:     No acute cardiopulmonary disease.      Electronically signed by: PEDRO LUIS ALLEN MD  Date:     05/11/17  Time:    23:51     Narrative:    Exam: Portable chest radiograph    Clinical History:   fatigue.    Findings:     Coarsening of bronchovascular markings/COPD. Heart size within normal limits.  Multilead pacemaker.

## 2017-05-12 NOTE — CONSULTS
Spoke with patient this AM, noted discharge orders placed. Patient with good appetite and intake, no recent changes in weight. Discussed Cardiac diet with patient. Materials attached to EMR for print at discharge.

## 2017-05-12 NOTE — PT/OT/SLP EVAL
Physical Therapy  Evaluation    Alyssa De Luna   MRN: [id number]   Admitting Diagnosis: Cerebrovascular accident (CVA)    PT Received On: 17  PT Start Time: 1012     PT Stop Time: 1036    PT Total Time (min): 24 min       Billable Minutes:  Evaluation 14 and Gait [id number]    Diagnosis: Cerebrovascular accident (CVA)      Past Medical History:   Diagnosis Date    Atrial fibrillation     High cholesterol     Stroke       Past Surgical History:   Procedure Laterality Date    CARDIAC PACEMAKER PLACEMENT      TOTAL KNEE ARTHROPLASTY Right        Referring physician: LCAUDIOMA  Date referred to PT: 2017      General Precautions: Standard,    Orthopedic Precautions:     Braces:              Patient History:  Lives With: alone  Living Arrangements: apartment  Home Accessibility: other (see comments) (PT HAS ELEVATOR TO ENTER APT)  Home Layout: Able to live on 1st floor  Transportation Available: car  Living Environment Comment: PT LIVES AT HOME IND AND DRIVES  Equipment Currently Used at Home: none  DME owned (not currently used): single point cane    Previous Level of Function:  Ambulation Skills: independent  Transfer Skills: independent  ADL Skills: independent  Work/Leisure Activity: independent    Subjective:  Communicated with NURSE HINOJOSA AND EPIC CHART REVIEW   prior to session.  PT AGREED TO EVAL AND TX  Chief Complaint: NONE  Patient goals: GO HOME    Pain Ratin/10               Pain Rating Post-Intervention: 0/10    Objective:   Patient found with: telemetry     Cognitive Exam:  Oriented to: Person, Place, Time and Situation    Follows Commands/attention: Follows multistep  commands  Communication: clear/fluent  Safety awareness/insight to disability: intact    Physical Exam:  Postural examination/scapula alignment: No postural abnormalities identified    Skin integrity: Visible skin intact  Edema: None noted     Sensation:   Intact    Upper Extremity Range of Motion:  Right Upper Extremity: WFL  Left  Upper Extremity: WFL    Upper Extremity Strength:  Right Upper Extremity: WFL  Left Upper Extremity: WFL    Lower Extremity Range of Motion:  Right Lower Extremity: WFL  Left Lower Extremity: WFL    Lower Extremity Strength:  Right Lower Extremity: WFL  Left Lower Extremity: WFL       Functional Mobility:  Bed Mobility:  Rolling/Turning Right: Independent  Scooting/Bridging: Independent  Supine to Sit: Independent  Sit to Supine: Independent    Transfers:  Sit <> Stand Assistance: Independent  Sit <> Stand Assistive Device: No Assistive Device  Bed <> Chair Technique: Stand Pivot  Bed <> Chair Assistance: Independent  Bed <> Chair Assistive Device: No Assistive Device    Gait:   Gait Distance: PT AMBULATED X 240' WITH NO LOB IND.  Assistance 1: Independent  Gait Assistive Device: No device  Gait Pattern: swing-through gait      Therapeutic Activities and Exercises:  PT EVAL COMPLETED AND GT ASSESSMENT COMPLETED.    AM-PAC 6 CLICK MOBILITY  How much help from another person does this patient currently need?   1 = Unable, Total/Dependent Assistance  2 = A lot, Maximum/Moderate Assistance  3 = A little, Minimum/Contact Guard/Supervision  4 = None, Modified [place]/Independent    Turning over in bed (including adjusting bedclothes, sheets and blankets)?: 4  Sitting down on and standing up from a chair with arms (e.g., wheelchair, bedside commode, etc.): 4  Moving from lying on back to sitting on the side of the bed?: 4  Moving to and from a bed to a chair (including a wheelchair)?: 4  Need to walk in hospital room?: 4  Climbing 3-5 steps with a railing?: 1 (NA)  Total Score: 21     AM-PAC Raw Score CMS G-Code Modifier Level of Impairment Assistance   6 % Total / Unable   7 - 9 CM 80 - 100% Maximal Assist   10 - 14 CL 60 - 80% Moderate Assist   15 - 19 CK 40 - 60% Moderate Assist   20 - 22 CJ 20 - 40% Minimal Assist   23 CI 1-20% SBA / CGA   24 CH 0% Independent/ Mod I     Patient left supine with call  button in reach and bed alarm on.    Assessment:   Alyssa De Luna is a 84 y.o. female with a medical diagnosis of Cerebrovascular accident (CVA) and presents with NO GROSS MOTOR DEFICITS. PT WILL BE D/C TO MOBILITY PROGRAM FOR WALKING    Discharge recommendations: Discharge Facility/Level Of Care Needs: home     Barriers to discharge: Barriers to Discharge: None    Equipment recommendations: Equipment Needed After Discharge: none     PLAN:    D/C HOME   Patient to be seen   to address the above listed problems via    Plan of Care expires:    Plan of Care reviewed with: patient    Functional Assessment Tool Used: [place] AM PAC  Score:   Functional Limitation: Mobility: Walking and moving around  Mobility: Walking and Moving Around Current Status ():   Mobility: Walking and Moving Around Goal Status ():   Mobility: Walking and Moving Around Discharge Status (): CELINA Morse, PT  05/12/2017

## 2017-05-13 LAB
ESTIMATED AVG GLUCOSE: 111 MG/DL
HBA1C MFR BLD HPLC: 5.5 %

## 2019-12-06 ENCOUNTER — APPOINTMENT (OUTPATIENT)
Dept: OTOLARYNGOLOGY | Facility: CLINIC | Age: 84
End: 2019-12-06

## 2019-12-06 PROBLEM — Z00.00 ENCOUNTER FOR PREVENTIVE HEALTH EXAMINATION: Status: ACTIVE | Noted: 2019-12-06

## 2019-12-10 ENCOUNTER — APPOINTMENT (OUTPATIENT)
Dept: OTOLARYNGOLOGY | Facility: CLINIC | Age: 84
End: 2019-12-10
Payer: MEDICARE

## 2019-12-10 VITALS
SYSTOLIC BLOOD PRESSURE: 133 MMHG | HEIGHT: 59 IN | HEART RATE: 82 BPM | WEIGHT: 125 LBS | BODY MASS INDEX: 25.2 KG/M2 | DIASTOLIC BLOOD PRESSURE: 66 MMHG

## 2019-12-10 DIAGNOSIS — Z86.79 PERSONAL HISTORY OF OTHER DISEASES OF THE CIRCULATORY SYSTEM: ICD-10-CM

## 2019-12-10 DIAGNOSIS — J01.00 ACUTE MAXILLARY SINUSITIS, UNSPECIFIED: ICD-10-CM

## 2019-12-10 DIAGNOSIS — Z86.73 PERSONAL HISTORY OF TRANSIENT ISCHEMIC ATTACK (TIA), AND CEREBRAL INFARCTION W/OUT RESIDUAL DEFICITS: ICD-10-CM

## 2019-12-10 DIAGNOSIS — Z86.39 PERSONAL HISTORY OF OTHER ENDOCRINE, NUTRITIONAL AND METABOLIC DISEASE: ICD-10-CM

## 2019-12-10 DIAGNOSIS — Z80.9 FAMILY HISTORY OF MALIGNANT NEOPLASM, UNSPECIFIED: ICD-10-CM

## 2019-12-10 DIAGNOSIS — Z87.39 PERSONAL HISTORY OF OTHER DISEASES OF THE MUSCULOSKELETAL SYSTEM AND CONNECTIVE TISSUE: ICD-10-CM

## 2019-12-10 DIAGNOSIS — Z87.891 PERSONAL HISTORY OF NICOTINE DEPENDENCE: ICD-10-CM

## 2019-12-10 PROCEDURE — 92550 TYMPANOMETRY & REFLEX THRESH: CPT

## 2019-12-10 PROCEDURE — 31231 NASAL ENDOSCOPY DX: CPT

## 2019-12-10 PROCEDURE — G0268 REMOVAL OF IMPACTED WAX MD: CPT

## 2019-12-10 PROCEDURE — 92557 COMPREHENSIVE HEARING TEST: CPT

## 2019-12-10 PROCEDURE — 99203 OFFICE O/P NEW LOW 30 MIN: CPT | Mod: 25

## 2019-12-10 RX ORDER — APIXABAN 5 MG/1
TABLET, FILM COATED ORAL
Refills: 0 | Status: ACTIVE | COMMUNITY

## 2019-12-10 RX ORDER — ROSUVASTATIN CALCIUM 5 MG/1
TABLET, FILM COATED ORAL
Refills: 0 | Status: ACTIVE | COMMUNITY

## 2019-12-10 RX ORDER — DIGOXIN 0.06 MG/1
TABLET ORAL
Refills: 0 | Status: ACTIVE | COMMUNITY

## 2019-12-10 RX ORDER — HYDROCHLOROTHIAZIDE 12.5 MG/1
TABLET ORAL
Refills: 0 | Status: ACTIVE | COMMUNITY

## 2019-12-10 NOTE — CONSULT LETTER
[Dear  ___] : Dear  [unfilled], [Consult Letter:] : I had the pleasure of evaluating your patient, [unfilled]. [Sincerely,] : Sincerely, [Please see my note below.] : Please see my note below. [Consult Closing:] : Thank you very much for allowing me to participate in the care of this patient.  If you have any questions, please do not hesitate to contact me. [FreeTextEntry3] : Alejo Irvin MD\par

## 2019-12-10 NOTE — HISTORY OF PRESENT ILLNESS
[de-identified] : HORTENCIA JOHNSON is a 87 year woman with a history of a fib on Eliquis.She complains of itchy crusty ears. She recently had viral chest infection and went to ER. She complains of thick drainage from the left nostril. Her nose runs every morning. An MRI of brain showed sinus cyst.

## 2019-12-10 NOTE — REVIEW OF SYSTEMS
[Post Nasal Drip] : post nasal drip [Ear Itch] : ear itch [Vertigo] : vertigo [Recurrent Ear Infections] : recurrent ear infections [Heart Rate Is Fast] : fast heart rate [Shortness Of Breath] : shortness of breath [Patient Intake Form Reviewed] : Patient intake form was reviewed

## 2019-12-10 NOTE — ASSESSMENT
[FreeTextEntry1] : HORTENCIA JOHNSON feels much better after removal of impacted cerumen. She has mild hearing loss. I do not think she needs hearing aids.her nasal exam is normal. She will continue with Flonase for about 2 weeks. If she is not doing well she will call me. RTC one year for cerumen removal and audiogram.

## 2020-02-14 ENCOUNTER — APPOINTMENT (OUTPATIENT)
Dept: OTOLARYNGOLOGY | Facility: CLINIC | Age: 85
End: 2020-02-14

## 2020-03-25 RX ORDER — FLUOCINOLONE ACETONIDE 0.11 MG/ML
0.01 OIL AURICULAR (OTIC)
Qty: 1 | Refills: 1 | Status: DISCONTINUED | COMMUNITY
Start: 2019-12-13 | End: 2020-03-25

## 2020-07-07 ENCOUNTER — APPOINTMENT (OUTPATIENT)
Dept: OTOLARYNGOLOGY | Facility: CLINIC | Age: 85
End: 2020-07-07

## 2020-09-03 ENCOUNTER — APPOINTMENT (OUTPATIENT)
Dept: OTOLARYNGOLOGY | Facility: CLINIC | Age: 85
End: 2020-09-03
Payer: MEDICARE

## 2020-09-03 VITALS — BODY MASS INDEX: 25.2 KG/M2 | WEIGHT: 125 LBS | TEMPERATURE: 96.5 F | HEIGHT: 59 IN

## 2020-09-03 PROCEDURE — 69210 REMOVE IMPACTED EAR WAX UNI: CPT

## 2020-09-03 PROCEDURE — 99214 OFFICE O/P EST MOD 30 MIN: CPT | Mod: 25

## 2020-09-03 NOTE — HISTORY OF PRESENT ILLNESS
[de-identified] : Well-known to the practice. She reports that her ears continue to itch her.  She used Dermotic oil which did not seem to help her.

## 2020-09-03 NOTE — ASSESSMENT
[FreeTextEntry1] : -Findings were reviewed and discussed with the patient in detail\par -Good aural hygiene reviewed\par -Patient may use wax removal drops as needed\par -Cortaid 1 %  OTC to the ear canal.\par -The patient was asked to call and return urgently if any problems\par -I will see the patient in follow up prn.

## 2020-10-30 ENCOUNTER — APPOINTMENT (OUTPATIENT)
Dept: OTOLARYNGOLOGY | Facility: CLINIC | Age: 85
End: 2020-10-30
Payer: MEDICARE

## 2020-10-30 VITALS — HEIGHT: 59 IN | TEMPERATURE: 98.7 F | BODY MASS INDEX: 25.2 KG/M2 | WEIGHT: 125 LBS

## 2020-10-30 PROCEDURE — 31231 NASAL ENDOSCOPY DX: CPT

## 2020-10-30 PROCEDURE — 92567 TYMPANOMETRY: CPT

## 2020-10-30 PROCEDURE — 99213 OFFICE O/P EST LOW 20 MIN: CPT | Mod: 25

## 2020-10-30 PROCEDURE — G0268 REMOVAL OF IMPACTED WAX MD: CPT

## 2020-10-30 PROCEDURE — 92557 COMPREHENSIVE HEARING TEST: CPT

## 2020-10-30 PROCEDURE — 99072 ADDL SUPL MATRL&STAF TM PHE: CPT

## 2020-10-30 NOTE — ASSESSMENT
[FreeTextEntry1] : HORTENCIA JOHNSON feels better after cerumen removal.  She will use DermOtic drops several times per week.She has some drainage left nostril. I will call with culture. I think her PND is secondary to LPR. I discussed the diet with her.Audiogram show presbycusis. I don’t think she needs hearing aids. I suggest TV amplifier.

## 2020-10-30 NOTE — REVIEW OF SYSTEMS
[Ear Itch] : ear itch [Negative] : Heme/Lymph [Patient Intake Form Reviewed] : Patient intake form was reviewed

## 2020-10-30 NOTE — CONSULT LETTER
[Dear  ___] : Dear  [unfilled], [Consult Letter:] : I had the pleasure of evaluating your patient, [unfilled]. [Please see my note below.] : Please see my note below. [Sincerely,] : Sincerely, [FreeTextEntry3] : Alejo Irvin MD\par

## 2020-10-30 NOTE — HISTORY OF PRESENT ILLNESS
[de-identified] : HORTENCIA JOHNSON is a 87 year woman with a history of afib on eliquis who has recurrent cerumen impaction. She is also complaining of PND and hoarseness at the end of the day. She is also having difficulty hearing her TV.

## 2020-11-06 LAB — BACTERIA FLD CULT: NORMAL

## 2021-03-09 ENCOUNTER — APPOINTMENT (OUTPATIENT)
Dept: OTOLARYNGOLOGY | Facility: CLINIC | Age: 86
End: 2021-03-09
Payer: MEDICARE

## 2021-03-09 VITALS — BODY MASS INDEX: 25.2 KG/M2 | WEIGHT: 125 LBS | HEIGHT: 59 IN | TEMPERATURE: 97.4 F

## 2021-03-09 DIAGNOSIS — H93.299 OTHER ABNORMAL AUDITORY PERCEPTIONS, UNSPECIFIED EAR: ICD-10-CM

## 2021-03-09 PROCEDURE — 92550 TYMPANOMETRY & REFLEX THRESH: CPT

## 2021-03-09 PROCEDURE — 92557 COMPREHENSIVE HEARING TEST: CPT

## 2021-03-09 PROCEDURE — G0268 REMOVAL OF IMPACTED WAX MD: CPT

## 2021-03-09 PROCEDURE — 99214 OFFICE O/P EST MOD 30 MIN: CPT | Mod: 25

## 2021-03-09 PROCEDURE — 99072 ADDL SUPL MATRL&STAF TM PHE: CPT

## 2021-03-13 NOTE — HISTORY OF PRESENT ILLNESS
[de-identified] : HORTENCIA JOHNSON is a 88 year woman with a history of cerumen impaction. She fell about 3 weeks while at home. She felt it was her balance. About 10 days ago she had just finished a massage and became very dizzy with n/v. She went to ER.\par She had head CT and all tests were reportedly negative. It sounds as though she has had positional dizziness for several weeks.

## 2021-03-13 NOTE — HISTORY OF PRESENT ILLNESS
[de-identified] : HORTENCIA JOHNSON is a 88 year woman with a history of cerumen impaction. She fell about 3 weeks while at home. She felt it was her balance. About 10 days ago she had just finished a massage and became very dizzy with n/v. She went to ER.\par She had head CT and all tests were reportedly negative. It sounds as though she has had positional dizziness for several weeks.

## 2021-08-24 ENCOUNTER — APPOINTMENT (OUTPATIENT)
Dept: OTOLARYNGOLOGY | Facility: CLINIC | Age: 86
End: 2021-08-24
Payer: MEDICARE

## 2021-08-24 DIAGNOSIS — L29.9 PRURITUS, UNSPECIFIED: ICD-10-CM

## 2021-08-24 DIAGNOSIS — H60.543 ACUTE ECZEMATOID OTITIS EXTERNA, BILATERAL: ICD-10-CM

## 2021-08-24 PROCEDURE — 31575 DIAGNOSTIC LARYNGOSCOPY: CPT

## 2021-08-24 PROCEDURE — 99214 OFFICE O/P EST MOD 30 MIN: CPT | Mod: 25

## 2021-08-24 PROCEDURE — 69210 REMOVE IMPACTED EAR WAX UNI: CPT

## 2021-08-24 RX ORDER — FLUOCINOLONE ACETONIDE 0.11 MG/ML
0.01 OIL AURICULAR (OTIC)
Qty: 1 | Refills: 2 | Status: DISCONTINUED | COMMUNITY
Start: 2020-03-17 | End: 2021-08-24

## 2021-08-24 NOTE — REASON FOR VISIT
[Subsequent Evaluation] : a subsequent evaluation for [Hearing Loss] : hearing loss [FreeTextEntry2] : Ear itch

## 2021-08-24 NOTE — ASSESSMENT
[FreeTextEntry1] : HORTENCIA JOHNSON will use Dermotic oil for her itchy ears. She will try Flonase. If it doesn’t help she will call me and I will try Atrovent .03% spray.\par \par I sent fungal culture of mouth.

## 2021-08-24 NOTE — HISTORY OF PRESENT ILLNESS
[de-identified] : HORTENCIA JOHNSON is a 88 year woman with a history of recent vertigo and fall who had Epley at [facility]. She has lots of watery nasal drainage and thick mucus in her throat and some nasal congestion.\par \par For about a week her mouth feels "tight"

## 2021-09-08 ENCOUNTER — NON-APPOINTMENT (OUTPATIENT)
Age: 86
End: 2021-09-08

## 2021-09-10 LAB — FUNGUS SPEC CULT ORG #8: ABNORMAL

## 2021-10-07 ENCOUNTER — NON-APPOINTMENT (OUTPATIENT)
Age: 86
End: 2021-10-07

## 2021-10-08 ENCOUNTER — NON-APPOINTMENT (OUTPATIENT)
Age: 86
End: 2021-10-08

## 2021-10-25 ENCOUNTER — APPOINTMENT (OUTPATIENT)
Dept: OTOLARYNGOLOGY | Facility: CLINIC | Age: 86
End: 2021-10-25

## 2021-10-27 ENCOUNTER — APPOINTMENT (OUTPATIENT)
Dept: OTOLARYNGOLOGY | Facility: CLINIC | Age: 86
End: 2021-10-27
Payer: MEDICARE

## 2021-10-27 VITALS — HEIGHT: 59 IN | WEIGHT: 125 LBS | BODY MASS INDEX: 25.2 KG/M2 | TEMPERATURE: 97.3 F

## 2021-10-27 DIAGNOSIS — J30.0 VASOMOTOR RHINITIS: ICD-10-CM

## 2021-10-27 PROCEDURE — 99214 OFFICE O/P EST MOD 30 MIN: CPT | Mod: 25

## 2021-10-27 PROCEDURE — 69210 REMOVE IMPACTED EAR WAX UNI: CPT

## 2021-10-27 PROCEDURE — 31231 NASAL ENDOSCOPY DX: CPT

## 2021-10-27 RX ORDER — FLUTICASONE PROPIONATE 50 UG/1
50 SPRAY, METERED NASAL DAILY
Qty: 2 | Refills: 5 | Status: DISCONTINUED | COMMUNITY
Start: 2020-03-27 | End: 2021-10-27

## 2021-10-27 RX ORDER — IPRATROPIUM BROMIDE 21 UG/1
0.03 SPRAY NASAL 3 TIMES DAILY
Qty: 1 | Refills: 5 | Status: ACTIVE | COMMUNITY
Start: 2021-10-27 | End: 1900-01-01

## 2021-10-27 NOTE — ASSESSMENT
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  The pathogenesis was discussed.  I recommended a trial of Atrovent .03 % and would switch to .06% if needed  and the option of ClariFlex was discussed but not recommended\par She complains that she has a purulent cough at times.  I did not see clinical evidence of sinusitis but I suggested imaging to be certain.\par She apparently had oral thrush but I did not see evidence of at this point nor is she symptomatic and I did not suggest further treatment.  I would recommend fungal culture with sensitivities and possible id consult if this recurs. \par It is my impression that the patient has an an exczematoid otitis externa.  The pathogenesis was discussed.  I suggested avoiding Q-tips.  I recommended topical moisturizing and using either Dermotic drops or other oil drops.  I suggested repeat evaluation in 6 months or earlier should the need arise.\par \par

## 2021-10-27 NOTE — PHYSICAL EXAM
[FreeTextEntry1] : General:\par The patient was alert and oriented and in no distress.\par Voice was clear.\par The patient looked less than the stated age\par \par Ears:\par The external ears were normal without deformity.\par The ear canals were clear.\par The tympanic membranes were intact and normal.\par She had some dry flaking skin on the left and cerumen impactions that were cleared microscopically without trauma with suction and curettage\par \par Nasal endoscopy: \par CPT 74000\par Procedure Note:\par \par Endoscopy was done with Covid precautions and with video. All risks and benefits were discussed with the patient and consent obtained.\par \par Nasal endoscopy was done with topical anesthesia of Pontocaine and Afrin and a      nasal endoscope.\par Indication: Nasal congestion, rule out sinusitis.\par Procedure: The nasal cavity was anesthetized with topical Afrin and Pontocaine. An  endoscope was used and inserted into the nasal cavity.\par Attention was first paid to the anterior nasal cavity.\par Endocoscopy was performed to inspect the interior of the nasal cavity, the nasal septum,  the middle and superior meati, the inferior, middle and superior turbinates, and the spheno-ethmoidal  recesses, the nasopharynx and eustachian tube orifices bilaterally. \par All findings were normal except:\par Nasal mucosa is flat and watery with an S-shaped deflection but without polyps, purulence or masses.\par \par Neck: \par The neck was symmetrical.\par The parotid and submandibular glands were normal without masses.\par The trachea was midline and there was no unusual crepitus.\par The thyroid was smooth and nontender and no masses were palpated.\par There was no significant cervical adenopathy.\par \par Oral cavity:\par The oral mucosa was normal.\par The oral and base of tongue were clear and without mass.\par The gingival and buccal mucosa were moist and without lesions.\par The palate moved well.\par There was no cleft to the palate.\par There appeared to be good salivary flow.  \par There was no pus, erythema or mass in the oral cavity.\par I did not see evidence of persistent thrush and her base of tongue is soft to palpation\par \par \par \par

## 2021-10-27 NOTE — CONSULT LETTER
[DrAndrew  ___] : Dr. GROSS [FreeTextEntry2] : SUZANNA NICOLE\par  [FreeTextEntry1] : \par \par Dear  Dr. SUZANNA NICOLE,\par \par I had the pleasure of seeing your patient today.  \par Please see my note below.\par \par \par Thank you very much for allowing me to participate in the care of your patient.\par \par Sincerely,\par \par Tom Wilkins MD\par [facility]\par [facility]\par  [facility]\par \par

## 2021-10-27 NOTE — HISTORY OF PRESENT ILLNESS
[de-identified] : HORTENCIA JOHNSON Seen at Dr. Irvin's request on October 27. She comes in with several complaints as far as the head and neck. She notes that both ears are pruritic. She has diffuse drops, what sounds like Dermotic in the past and he helped.\par She complains of a watery nasal discharge every morning in the time she has a cough that is productive in the morning and a yellow color.\par She apparently has been treated for thrush as well and went stepped to be evaluated.  Culture in August showed few candida albicans.\par The patient had no other ear nose or throat complaints at this visit.

## 2021-11-30 ENCOUNTER — APPOINTMENT (OUTPATIENT)
Dept: OTOLARYNGOLOGY | Facility: CLINIC | Age: 86
End: 2021-11-30
Payer: MEDICARE

## 2021-11-30 VITALS — WEIGHT: 122 LBS | BODY MASS INDEX: 24.6 KG/M2 | TEMPERATURE: 96 F | HEIGHT: 59 IN

## 2021-11-30 DIAGNOSIS — K21.9 GASTRO-ESOPHAGEAL REFLUX DISEASE W/OUT ESOPHAGITIS: ICD-10-CM

## 2021-11-30 DIAGNOSIS — K14.0 GLOSSITIS: ICD-10-CM

## 2021-11-30 PROCEDURE — 99213 OFFICE O/P EST LOW 20 MIN: CPT | Mod: 25

## 2021-11-30 PROCEDURE — 69210 REMOVE IMPACTED EAR WAX UNI: CPT

## 2021-11-30 PROCEDURE — 31575 DIAGNOSTIC LARYNGOSCOPY: CPT

## 2021-11-30 NOTE — ASSESSMENT
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 She will use dermotic drops and nasal spray. I will speak to her in one week.\par \par I recultured her tongue/mouth for fungus.

## 2021-11-30 NOTE — HISTORY OF PRESENT ILLNESS
[de-identified] : HORTENCIA JOHNSON is a 89 year woman with a history of rhinorrhea and PND. She hasn’t been  using Atrovent. Her mouth feels better after Mycelex troches. She feels it may be worse.

## 2022-01-14 LAB — FUNGUS SPEC CULT ORG #8: ABNORMAL

## 2022-02-01 ENCOUNTER — APPOINTMENT (OUTPATIENT)
Dept: OTOLARYNGOLOGY | Facility: CLINIC | Age: 87
End: 2022-02-01
Payer: MEDICARE

## 2022-02-01 DIAGNOSIS — H61.23 IMPACTED CERUMEN, BILATERAL: ICD-10-CM

## 2022-02-01 PROCEDURE — 99214 OFFICE O/P EST MOD 30 MIN: CPT | Mod: 25

## 2022-02-01 PROCEDURE — 69210 REMOVE IMPACTED EAR WAX UNI: CPT

## 2022-02-01 RX ORDER — FLUOCINOLONE ACETONIDE 0.11 MG/ML
0.01 OIL AURICULAR (OTIC)
Qty: 1 | Refills: 5 | Status: DISCONTINUED | COMMUNITY
Start: 2021-10-27 | End: 2022-02-01

## 2022-02-01 RX ORDER — FLUOCINOLONE ACETONIDE 0.11 MG/ML
0.01 OIL AURICULAR (OTIC)
Qty: 1 | Refills: 2 | Status: DISCONTINUED | COMMUNITY
Start: 2020-03-27 | End: 2022-02-01

## 2022-02-01 RX ORDER — FLUOCINOLONE ACETONIDE 0.11 MG/ML
0.01 OIL AURICULAR (OTIC)
Qty: 1 | Refills: 2 | Status: ACTIVE | COMMUNITY
Start: 2021-08-24 | End: 1900-01-01

## 2022-02-01 NOTE — REASON FOR VISIT
[Subsequent Evaluation] : a subsequent evaluation for [FreeTextEntry2] : ears feel clogged and ear itch

## 2022-02-01 NOTE — CONSULT LETTER
[Please see my note below.] : Please see my note below. [FreeTextEntry2] : Dear SUZANNA NICOLE  [FreeTextEntry1] : Thank you for allowing me to participate in the care of HORTENCIA JOHNSON .\par Please see the attached visit note.\par \par \par \par Luis Warner\par Otology\par Medical Director of [facility]\par Department of Otolaryngology\par [facility]

## 2022-02-01 NOTE — HISTORY OF PRESENT ILLNESS
[de-identified] : HORTENCIA JOHNSON has a history of bilateral hearing loss with increasing fullness in the ears of uncertain duration.  Itching in the left ear reported. Previously treated with otic drops. Uses cotton swabs in the ear for itch and cleaning.

## 2023-01-05 NOTE — ASSESSMENT
[FreeTextEntry1] : Ear hygiene reviewed in detail.  Follow up recommended if symptoms persist or progresses.  Routine follow up for cerumen management suggested.\par \par We reviewed the use of steroid-containing otic drops for ear canal itching.\par \par I have reviewed the audiometric findings in detail and the management options.\par I have recommended considering amplification for hearing loss and offered a referral to the [facility]. 
Implemented All Universal Safety Interventions:  [place] to call system. Call bell, personal items and telephone within reach. Instruct patient to call for assistance. Room bathroom lighting operational. Non-slip footwear when patient is off stretcher. Physically safe environment: no spills, clutter or unnecessary equipment. Stretcher in lowest position, wheels locked, appropriate side rails in place.

## 2023-07-13 ENCOUNTER — APPOINTMENT (OUTPATIENT)
Dept: OTOLARYNGOLOGY | Facility: CLINIC | Age: 88
End: 2023-07-13
Payer: MEDICARE

## 2023-07-13 DIAGNOSIS — H90.3 SENSORINEURAL HEARING LOSS, BILATERAL: ICD-10-CM

## 2023-07-13 DIAGNOSIS — H61.23 IMPACTED CERUMEN, BILATERAL: ICD-10-CM

## 2023-07-13 DIAGNOSIS — H60.8X3 OTHER OTITIS EXTERNA, BILATERAL: ICD-10-CM

## 2023-07-13 DIAGNOSIS — J30.0 VASOMOTOR RHINITIS: ICD-10-CM

## 2023-07-13 PROCEDURE — 92557 COMPREHENSIVE HEARING TEST: CPT

## 2023-07-13 PROCEDURE — 92567 TYMPANOMETRY: CPT

## 2023-07-13 PROCEDURE — 99213 OFFICE O/P EST LOW 20 MIN: CPT | Mod: 25

## 2023-07-13 PROCEDURE — 69210 REMOVE IMPACTED EAR WAX UNI: CPT

## 2023-07-13 RX ORDER — IPRATROPIUM BROMIDE 21 UG/1
0.03 SPRAY NASAL
Qty: 1 | Refills: 3 | Status: ACTIVE | COMMUNITY
Start: 2023-07-13 | End: 1900-01-01

## 2023-07-13 RX ORDER — FLUOCINOLONE ACETONIDE 0.11 MG/ML
0.01 OIL AURICULAR (OTIC)
Qty: 1 | Refills: 2 | Status: ACTIVE | COMMUNITY
Start: 2023-07-13 | End: 1900-01-01

## 2023-07-13 NOTE — REASON FOR VISIT
[Subsequent Evaluation] : a subsequent evaluation for [Hearing Loss] : hearing loss [FreeTextEntry2] : follow up / wax

## 2023-07-13 NOTE — HISTORY OF PRESENT ILLNESS
[de-identified] : HORTENCIA JOHNSON is a 89 year woman with a history of EAC dermatitis, cerumen impaction and presbycusis. She also describes having VMR.

## 2023-07-13 NOTE — ASSESSMENT
[FreeTextEntry1] : HORTENCIA JOHNSON felt better after impacted cerumen removed. Audiogram shows SNHL with normal tymps. I suggest hearing aids. She will use DermOtic drops 2-3 times per week for itchy ears.\par I will prescribe Ipratropium .03% for her Vasomotor rhinitis. RTC 6 months.

## 2023-07-17 PROBLEM — H90.3 SENSORINEURAL HEARING LOSS (SNHL) OF BOTH EARS: Status: ACTIVE | Noted: 2020-10-30

## 2024-01-11 ENCOUNTER — APPOINTMENT (OUTPATIENT)
Dept: OTOLARYNGOLOGY | Facility: CLINIC | Age: 89
End: 2024-01-11

## 2024-01-17 ENCOUNTER — TRANSCRIPTION ENCOUNTER (OUTPATIENT)
Age: 89
End: 2024-01-17

## 2024-08-06 ENCOUNTER — APPOINTMENT (OUTPATIENT)
Dept: OTOLARYNGOLOGY | Facility: CLINIC | Age: 89
End: 2024-08-06

## 2024-08-15 ENCOUNTER — APPOINTMENT (OUTPATIENT)
Dept: OTOLARYNGOLOGY | Facility: CLINIC | Age: 89
End: 2024-08-15

## 2024-08-15 VITALS — WEIGHT: 120 LBS | BODY MASS INDEX: 24.19 KG/M2 | HEIGHT: 59 IN

## 2024-08-15 DIAGNOSIS — K21.9 GASTRO-ESOPHAGEAL REFLUX DISEASE W/OUT ESOPHAGITIS: ICD-10-CM

## 2024-08-15 DIAGNOSIS — H61.23 IMPACTED CERUMEN, BILATERAL: ICD-10-CM

## 2024-08-15 PROCEDURE — 31575 DIAGNOSTIC LARYNGOSCOPY: CPT

## 2024-08-15 PROCEDURE — 69210 REMOVE IMPACTED EAR WAX UNI: CPT | Mod: 59

## 2024-08-15 PROCEDURE — 99213 OFFICE O/P EST LOW 20 MIN: CPT | Mod: 25

## 2024-08-15 NOTE — ASSESSMENT
[FreeTextEntry1] : HORTENCIA JOHNSON felt better after having impacted cerumen removed. She is having post COVID LPR. I suggested and discussed in detail a strict reflux diet and gave the patient a handout. I also suggested alkaline water to denature refluxed in the throat. I am recommending Famotidine 20mg  before bedtime. RTC 4 weeks

## 2024-08-15 NOTE — HISTORY OF PRESENT ILLNESS
[de-identified] : HORTENCIA JOHNSON  is a 89 year woman with a history of presbycusis who uses hearing aids. She hasn't been using her new hearing aids. She has PND and cough following recent COVID.

## 2024-08-15 NOTE — REVIEW OF SYSTEMS
[Ear Itch] : ear itch [Negative] : Heme/Lymph [Post Nasal Drip] : post nasal drip [Patient Intake Form Reviewed] : Patient intake form was reviewed [de-identified] : nasal drip

## 2024-08-15 NOTE — PHYSICAL EXAM
[TextEntry] : PHYSICAL EXAM  General: The patient was alert and oriented and in no distress. Voice was normal.   Face: The patient had no facial asymmetry or mass. The skin was unremarkable.  Ears: External ears were normal without deformity. Ear canals were normal. Tympanic membranes were intact and normal. No perforation or effusion I removed impacted cerumen from both ears under the microscope with curet, forceps and suction (irrigation AS)  Nose:  The external nose had no significant deformity.  There was no facial tenderness.  On anterior rhinoscopy, the nasal mucosa was normal.  The anterior septum was normal. There were no visualized polyps purulence  or masses.  Oral cavity: The oral mucosa was normal. The oral and base of tongue were clear and without mass. The gingival and buccal mucosa were moist and without lesions. The palate moved well. There was no cleft palate. There appeared to be good salivary flow.   There was no pus, erythema or mass in the oral cavity/oropharynx.  Neck:  The neck was symmetrical. The parotid and submandibular glands were normal without masses. The trachea was midline and there was no unusual crepitus. Thyroid was smooth and nontender and no masses were palpated. Cervical adenopathy- none.  Procedure: Fiberoptic Nasolaryngoscopy Dx: Dysphagia, LPRD, Hoarseness Dr. Alejo Irvin Anesthetic: Lidocaine and oxymetazoline topical   Findings: The flexible scope was easily passed via the nose. The larynx was grossly normal. The epiglottis was normal. The hypopharynx and base of tongue were normal. The vallecula was normal. The vocal folds were grossly normal and moved normally. There are  mild changes of laryngopharyngeal reflux  (LPR)  manifest by mild ventricular swelling,  posterior commissure thickening.   No lesions were noted.

## 2024-09-06 ENCOUNTER — APPOINTMENT (OUTPATIENT)
Dept: OTOLARYNGOLOGY | Facility: CLINIC | Age: 89
End: 2024-09-06

## 2024-09-06 DIAGNOSIS — K21.9 GASTRO-ESOPHAGEAL REFLUX DISEASE W/OUT ESOPHAGITIS: ICD-10-CM

## 2024-09-06 DIAGNOSIS — T16.2XXA FOREIGN BODY IN LEFT EAR, INITIAL ENCOUNTER: ICD-10-CM

## 2024-09-06 PROCEDURE — 99213 OFFICE O/P EST LOW 20 MIN: CPT | Mod: 25

## 2024-09-06 PROCEDURE — 69200 CLEAR OUTER EAR CANAL: CPT

## 2024-09-06 RX ORDER — VERAPAMIL HYDROCHLORIDE 80 MG/1
TABLET ORAL
Refills: 0 | Status: ACTIVE | COMMUNITY

## 2024-09-06 RX ORDER — CLOTRIMAZOLE AND BETAMETHASONE DIPROPIONATE 10; .5 MG/G; MG/G
1-0.05 CREAM TOPICAL
Qty: 1 | Refills: 0 | Status: ACTIVE | COMMUNITY
Start: 2024-09-06 | End: 1900-01-01

## 2024-09-06 RX ORDER — CHROMIUM 200 MCG
TABLET ORAL
Refills: 0 | Status: ACTIVE | COMMUNITY

## 2024-09-06 NOTE — HISTORY OF PRESENT ILLNESS
[de-identified] : HORTENCIA JOHNSON  is a 89 year woman with a history of LPR and EAC dermatitis. She has a flaky itchy area left ear. She put OTC cortisone cream and clogged her ear.

## 2024-09-06 NOTE — ASSESSMENT
[FreeTextEntry1] : HORTENCIA CISNEROS will have her daughter apply lotrisone sukumar to effected area on left ear. I reviewed LPR treatment with her and gave her a handout. RTC 4 weeks

## 2024-09-06 NOTE — PHYSICAL EXAM
[TextEntry] : PHYSICAL EXAM  General: The patient was alert and oriented and in no distress. Voice was normal.  Neurologic: Cranial Nerves II-XII intact PERRLA There was no significant nystagmus or disconjugate gaze noted.  EOM's: Normal  Face: The patient had no facial asymmetry or mass. The skin was unremarkable.  Ears: External ears were normal without deformity. Ear canals were normal. Tympanic membranes were intact and normal. No perforation or effusion I removed a great deal of ointment from left ear. she has small area of flaking and red skin at External auditory meatus on the left. Nose:  The external nose had no significant deformity.  There was no facial tenderness.  On anterior rhinoscopy, the nasal mucosa was normal.  The anterior septum was normal. There were no visualized polyps purulence  or masses.  Oral cavity: The oral mucosa was normal. The oral and base of tongue were clear and without mass. The gingival and buccal mucosa were moist and without lesions. The palate moved well. There was no cleft palate. There appeared to be good salivary flow.   There was no pus, erythema or mass in the oral cavity/oropharynx.  Neck:  The neck was symmetrical. The parotid and submandibular glands were normal without masses. The trachea was midline and there was no unusual crepitus. Thyroid was smooth and nontender and no masses were palpated. Cervical adenopathy- none.

## 2024-10-25 ENCOUNTER — APPOINTMENT (OUTPATIENT)
Dept: OTOLARYNGOLOGY | Facility: CLINIC | Age: 89
End: 2024-10-25

## 2024-12-05 ENCOUNTER — APPOINTMENT (OUTPATIENT)
Dept: OTOLARYNGOLOGY | Facility: CLINIC | Age: 88
End: 2024-12-05
Payer: MEDICARE

## 2024-12-05 VITALS — WEIGHT: 114 LBS | HEIGHT: 59 IN | BODY MASS INDEX: 22.98 KG/M2

## 2024-12-05 DIAGNOSIS — H60.8X3 OTHER OTITIS EXTERNA, BILATERAL: ICD-10-CM

## 2024-12-05 DIAGNOSIS — H61.23 IMPACTED CERUMEN, BILATERAL: ICD-10-CM

## 2024-12-05 PROCEDURE — 69210 REMOVE IMPACTED EAR WAX UNI: CPT

## 2024-12-05 PROCEDURE — 99213 OFFICE O/P EST LOW 20 MIN: CPT | Mod: 25

## 2024-12-05 RX ORDER — DOXYCYCLINE HYCLATE 50 MG/1
CAPSULE ORAL
Refills: 0 | Status: ACTIVE | COMMUNITY

## 2025-02-27 ENCOUNTER — APPOINTMENT (OUTPATIENT)
Dept: OTOLARYNGOLOGY | Facility: CLINIC | Age: 89
End: 2025-02-27
Payer: MEDICARE

## 2025-02-27 DIAGNOSIS — H61.23 IMPACTED CERUMEN, BILATERAL: ICD-10-CM

## 2025-02-27 DIAGNOSIS — H60.8X3 OTHER OTITIS EXTERNA, BILATERAL: ICD-10-CM

## 2025-02-27 PROCEDURE — 69210 REMOVE IMPACTED EAR WAX UNI: CPT

## 2025-02-27 PROCEDURE — 99213 OFFICE O/P EST LOW 20 MIN: CPT | Mod: 25

## 2025-04-24 ENCOUNTER — APPOINTMENT (OUTPATIENT)
Dept: OTOLARYNGOLOGY | Facility: CLINIC | Age: 89
End: 2025-04-24
Payer: MEDICARE

## 2025-04-24 DIAGNOSIS — H61.23 IMPACTED CERUMEN, BILATERAL: ICD-10-CM

## 2025-04-24 DIAGNOSIS — H60.543 ACUTE ECZEMATOID OTITIS EXTERNA, BILATERAL: ICD-10-CM

## 2025-04-24 DIAGNOSIS — H60.8X3 OTHER OTITIS EXTERNA, BILATERAL: ICD-10-CM

## 2025-04-24 PROCEDURE — 99213 OFFICE O/P EST LOW 20 MIN: CPT | Mod: 25

## 2025-04-24 PROCEDURE — 69210 REMOVE IMPACTED EAR WAX UNI: CPT

## 2025-09-18 ENCOUNTER — APPOINTMENT (OUTPATIENT)
Dept: OTOLARYNGOLOGY | Facility: CLINIC | Age: 89
End: 2025-09-18
Payer: MEDICARE

## 2025-09-18 DIAGNOSIS — H61.23 IMPACTED CERUMEN, BILATERAL: ICD-10-CM

## 2025-09-18 PROCEDURE — 69210 REMOVE IMPACTED EAR WAX UNI: CPT
